# Patient Record
Sex: MALE | Race: WHITE | Employment: OTHER | URBAN - METROPOLITAN AREA
[De-identification: names, ages, dates, MRNs, and addresses within clinical notes are randomized per-mention and may not be internally consistent; named-entity substitution may affect disease eponyms.]

---

## 2017-05-09 ENCOUNTER — HOSPITAL ENCOUNTER (OUTPATIENT)
Dept: CT IMAGING | Age: 72
Discharge: HOME | End: 2017-05-09
Attending: INTERNAL MEDICINE
Payer: MEDICARE

## 2017-05-09 DIAGNOSIS — I71.20 THORACIC AORTIC ANEURYSM WITHOUT RUPTURE: ICD-10-CM

## 2017-05-09 PROCEDURE — 74011636320 HC RX REV CODE- 636/320: Performed by: INTERNAL MEDICINE

## 2017-05-09 PROCEDURE — 74011000258 HC RX REV CODE- 258: Performed by: INTERNAL MEDICINE

## 2017-05-09 PROCEDURE — 71275 CT ANGIOGRAPHY CHEST: CPT

## 2017-05-09 RX ORDER — SODIUM CHLORIDE 0.9 % (FLUSH) 0.9 %
10 SYRINGE (ML) INJECTION
Status: COMPLETED | OUTPATIENT
Start: 2017-05-09 | End: 2017-05-09

## 2017-05-09 RX ADMIN — IOPAMIDOL 100 ML: 755 INJECTION, SOLUTION INTRAVENOUS at 09:16

## 2017-05-09 RX ADMIN — Medication 10 ML: at 09:16

## 2017-05-09 RX ADMIN — SODIUM CHLORIDE 100 ML: 900 INJECTION, SOLUTION INTRAVENOUS at 09:16

## 2017-05-22 ENCOUNTER — HOSPITAL ENCOUNTER (OUTPATIENT)
Dept: GENERAL RADIOLOGY | Age: 72
Discharge: HOME | End: 2017-05-22
Payer: MEDICARE

## 2017-05-22 DIAGNOSIS — R76.11 HISTORY OF POSITIVE PPD, UNTREATED: Chronic | ICD-10-CM

## 2017-05-22 PROCEDURE — 71020 XR CHEST PA LAT: CPT

## 2018-01-17 ENCOUNTER — HOSPITAL ENCOUNTER (OUTPATIENT)
Dept: LAB | Age: 73
Discharge: HOME | End: 2018-01-17

## 2018-01-17 PROCEDURE — 88312 SPECIAL STAINS GROUP 1: CPT | Performed by: INTERNAL MEDICINE

## 2018-01-17 PROCEDURE — 88305 TISSUE EXAM BY PATHOLOGIST: CPT | Performed by: INTERNAL MEDICINE

## 2018-05-03 ENCOUNTER — HOSPITAL ENCOUNTER (OUTPATIENT)
Dept: CT IMAGING | Age: 73
Discharge: HOME | End: 2018-05-03
Attending: THORACIC SURGERY (CARDIOTHORACIC VASCULAR SURGERY)
Payer: MEDICARE

## 2018-05-03 DIAGNOSIS — I71.21 THORACIC ASCENDING AORTIC ANEURYSM: ICD-10-CM

## 2018-05-03 PROCEDURE — 74011636320 HC RX REV CODE- 636/320: Performed by: THORACIC SURGERY (CARDIOTHORACIC VASCULAR SURGERY)

## 2018-05-03 PROCEDURE — 71275 CT ANGIOGRAPHY CHEST: CPT

## 2018-05-03 PROCEDURE — 74011000258 HC RX REV CODE- 258: Performed by: THORACIC SURGERY (CARDIOTHORACIC VASCULAR SURGERY)

## 2018-05-03 RX ORDER — SODIUM CHLORIDE 0.9 % (FLUSH) 0.9 %
10 SYRINGE (ML) INJECTION
Status: COMPLETED | OUTPATIENT
Start: 2018-05-03 | End: 2018-05-03

## 2018-05-03 RX ADMIN — SODIUM CHLORIDE 100 ML: 900 INJECTION, SOLUTION INTRAVENOUS at 11:14

## 2018-05-03 RX ADMIN — IOPAMIDOL 119 ML: 755 INJECTION, SOLUTION INTRAVENOUS at 11:14

## 2018-05-03 RX ADMIN — Medication 10 ML: at 11:14

## 2019-09-11 ENCOUNTER — HOSPITAL ENCOUNTER (OUTPATIENT)
Dept: ULTRASOUND IMAGING | Age: 74
Discharge: HOME | End: 2019-09-11
Attending: FAMILY MEDICINE
Payer: MEDICARE

## 2019-09-11 DIAGNOSIS — I71.20 THORACIC AORTIC ANEURYSM WITHOUT RUPTURE: ICD-10-CM

## 2019-09-11 PROCEDURE — 93978 VASCULAR STUDY: CPT

## 2021-09-16 ENCOUNTER — HOSPITAL ENCOUNTER (OUTPATIENT)
Dept: CT IMAGING | Age: 76
Discharge: HOME | End: 2021-09-16
Attending: INTERNAL MEDICINE
Payer: MEDICARE

## 2021-09-16 DIAGNOSIS — I71.20 THORACIC AORTIC ANEURYSM WITHOUT RUPTURE: Chronic | ICD-10-CM

## 2021-09-16 LAB — CREAT BLD-MCNC: 1.09 MG/DL (ref 0.8–1.5)

## 2021-09-16 PROCEDURE — 82565 ASSAY OF CREATININE: CPT

## 2021-09-16 PROCEDURE — 74011000636 HC RX REV CODE- 636: Performed by: INTERNAL MEDICINE

## 2021-09-16 PROCEDURE — 74011000258 HC RX REV CODE- 258: Performed by: INTERNAL MEDICINE

## 2021-09-16 PROCEDURE — 71275 CT ANGIOGRAPHY CHEST: CPT

## 2021-09-16 RX ORDER — SODIUM CHLORIDE 0.9 % (FLUSH) 0.9 %
10 SYRINGE (ML) INJECTION
Status: COMPLETED | OUTPATIENT
Start: 2021-09-16 | End: 2021-09-16

## 2021-09-16 RX ADMIN — Medication 10 ML: at 10:19

## 2021-09-16 RX ADMIN — IOPAMIDOL 100 ML: 755 INJECTION, SOLUTION INTRAVENOUS at 10:19

## 2021-09-16 RX ADMIN — SODIUM CHLORIDE 100 ML: 900 INJECTION, SOLUTION INTRAVENOUS at 10:19

## 2022-08-02 ENCOUNTER — OFFICE VISIT (OUTPATIENT)
Dept: CARDIOLOGY CLINIC | Age: 77
End: 2022-08-02
Payer: MEDICARE

## 2022-08-02 VITALS
WEIGHT: 208.5 LBS | DIASTOLIC BLOOD PRESSURE: 84 MMHG | HEART RATE: 60 BPM | HEIGHT: 74 IN | BODY MASS INDEX: 26.76 KG/M2 | SYSTOLIC BLOOD PRESSURE: 136 MMHG

## 2022-08-02 DIAGNOSIS — I10 PRIMARY HYPERTENSION: ICD-10-CM

## 2022-08-02 DIAGNOSIS — I35.0 SEVERE AORTIC VALVE STENOSIS: Primary | ICD-10-CM

## 2022-08-02 DIAGNOSIS — Z95.3 HISTORY OF AORTIC VALVE REPLACEMENT WITH BIOPROSTHETIC VALVE: ICD-10-CM

## 2022-08-02 DIAGNOSIS — I71.20 THORACIC AORTIC ANEURYSM WITHOUT RUPTURE: ICD-10-CM

## 2022-08-02 PROCEDURE — G8417 CALC BMI ABV UP PARAM F/U: HCPCS | Performed by: INTERNAL MEDICINE

## 2022-08-02 PROCEDURE — 1123F ACP DISCUSS/DSCN MKR DOCD: CPT | Performed by: INTERNAL MEDICINE

## 2022-08-02 PROCEDURE — 99214 OFFICE O/P EST MOD 30 MIN: CPT | Performed by: INTERNAL MEDICINE

## 2022-08-02 PROCEDURE — G8428 CUR MEDS NOT DOCUMENT: HCPCS | Performed by: INTERNAL MEDICINE

## 2022-08-02 PROCEDURE — 93000 ELECTROCARDIOGRAM COMPLETE: CPT | Performed by: INTERNAL MEDICINE

## 2022-08-02 PROCEDURE — 4004F PT TOBACCO SCREEN RCVD TLK: CPT | Performed by: INTERNAL MEDICINE

## 2022-08-02 PROCEDURE — 3017F COLORECTAL CA SCREEN DOC REV: CPT | Performed by: INTERNAL MEDICINE

## 2022-08-02 ASSESSMENT — ENCOUNTER SYMPTOMS
ABDOMINAL PAIN: 0
SHORTNESS OF BREATH: 0

## 2022-08-02 NOTE — PROGRESS NOTES
[address], [address] [facility], [address] [place] [address]  PHONE: [phone]    Jaems Garcia  1945      SUBJECTIVE:   James Garcia is a 78 y.o. male seen for a follow up visit regarding the following:     Chief Complaint   Patient presents with    Follow-up     S/P AVR       HPI:    81-year-old male comes back for follow-up of a history of a bioprosthetic aortic valve replacement for aortic stenosis history of dilated thoracic aorta which is not been operated on in the past.  Last CT a year ago September was 4.5 cm. He has been doing well with no complaints of shortness of breath or chest pain we had him on ARB in the past discussed in the past going back on that but he does not really want to take the medicines       Past Medical History, Past Surgical History, Family history, Social History, and Medications were all reviewed with the patient today and updated as necessary. Allergies   Allergen Reactions    Pseudoephedrine Hcl Other (See Comments)     Agitated & blisters on hands & feet.     Triprolidine-Pseudoephedrine Other (See Comments)     Make him hyper and the palms of his hands blister and peel     Past Medical History:   Diagnosis Date    Allergic rhinitis 4/23/2014    Aortic stenosis 10/16/2015    Arrhythmia     Arthritis     Atelectasis 7/10/2015    Dysphagia 5/21/2015    Mostly with \"stringy\" foods     Environmental allergies     GERD (gastroesophageal reflux disease)     Hiatal hernia     History of tuberculosis exposure     tested positive 30 years ago     Hypernatremia 7/10/2015    Hypertension     Mononucleosis     Onychomycosis 4/23/2014    S/P AVR (aortic valve replacement) 7/8/2015    Seborrheic keratosis 4/23/2014    Severe aortic valve stenosis 7/9/2015    7/8/15 (Dr Grey Achilles) AORTIC VALVE REPLACEMENT (AVR)with 25 mm Tilley bovine pericardial valve; ascending aortoplasty 4/25/15 ECHO Ejection fraction was estimated in the range of 60 % to 65 %      Thoracic aortic aneurysm ([address] 07/27/2021 12:03 PM     07/27/2021 12:03 PM    CO2 25 07/27/2021 12:03 PM    BUN 16 07/27/2021 12:03 PM    GFRAA >60 09/16/2021 10:16 AM     Lab Results   Component Value Date/Time    CHOL 155 08/30/2019 09:35 AM    HDL 66 08/30/2019 09:35 AM         ASSESSMENT and Madison Anurag was seen today for follow-up. Diagnoses and all orders for this visit:    Severe aortic valve stenosis status post aortic valve replacement doing well  -     EKG 12 Lead    Thoracic aortic aneurysm ([address] we will follow-up with CT scan again to make sure it has not changed. We used to have him on ARB and he does not really want take that medicine right now. We will reassess the CT scan make sure it has not changed any  -     EKG 12 Lead  -     CTA CHEST W CONTRAST; Future    Hypertension currently stable on no current medications. Currently stable  -     EKG 12 Lead    History of aortic valve replacement with bioprosthetic valve the valve is doing well last echo showed a stable function  -     CTA CHEST W CONTRAST; Future      [unfilled]      No follow-up provider specified.     Florence Dao MD  8/2/2022  12:45 PM

## 2022-09-01 ENCOUNTER — OFFICE VISIT (OUTPATIENT)
Dept: FAMILY MEDICINE CLINIC | Facility: CLINIC | Age: 77
End: 2022-09-01
Payer: MEDICARE

## 2022-09-01 VITALS
HEIGHT: 74 IN | DIASTOLIC BLOOD PRESSURE: 70 MMHG | WEIGHT: 206 LBS | OXYGEN SATURATION: 98 % | SYSTOLIC BLOOD PRESSURE: 124 MMHG | BODY MASS INDEX: 26.44 KG/M2 | HEART RATE: 63 BPM

## 2022-09-01 DIAGNOSIS — R35.1 NOCTURIA: ICD-10-CM

## 2022-09-01 DIAGNOSIS — Z00.00 MEDICARE ANNUAL WELLNESS VISIT, SUBSEQUENT: Primary | ICD-10-CM

## 2022-09-01 DIAGNOSIS — K21.9 GASTRO-ESOPHAGEAL REFLUX DISEASE WITHOUT ESOPHAGITIS: ICD-10-CM

## 2022-09-01 DIAGNOSIS — I71.20 THORACIC AORTIC ANEURYSM, WITHOUT RUPTURE: ICD-10-CM

## 2022-09-01 LAB
ALBUMIN SERPL-MCNC: 3.8 G/DL (ref 3.2–4.6)
ALBUMIN/GLOB SERPL: 1.1 {RATIO} (ref 1.2–3.5)
ALP SERPL-CCNC: 50 U/L (ref 50–136)
ALT SERPL-CCNC: 39 U/L (ref 12–65)
ANION GAP SERPL CALC-SCNC: 5 MMOL/L (ref 4–13)
AST SERPL-CCNC: 22 U/L (ref 15–37)
BASOPHILS # BLD: 0 K/UL (ref 0–0.2)
BASOPHILS NFR BLD: 0 % (ref 0–2)
BILIRUB SERPL-MCNC: 0.4 MG/DL (ref 0.2–1.1)
BUN SERPL-MCNC: 18 MG/DL (ref 8–23)
CALCIUM SERPL-MCNC: 9.1 MG/DL (ref 8.3–10.4)
CHLORIDE SERPL-SCNC: 109 MMOL/L (ref 101–110)
CHOLEST SERPL-MCNC: 147 MG/DL
CO2 SERPL-SCNC: 25 MMOL/L (ref 21–32)
CREAT SERPL-MCNC: 1.1 MG/DL (ref 0.8–1.5)
DIFFERENTIAL METHOD BLD: ABNORMAL
EOSINOPHIL # BLD: 0.1 K/UL (ref 0–0.8)
EOSINOPHIL NFR BLD: 1 % (ref 0.5–7.8)
ERYTHROCYTE [DISTWIDTH] IN BLOOD BY AUTOMATED COUNT: 13.5 % (ref 11.9–14.6)
GLOBULIN SER CALC-MCNC: 3.4 G/DL (ref 2.3–3.5)
GLUCOSE SERPL-MCNC: 112 MG/DL (ref 65–100)
HCT VFR BLD AUTO: 42.9 % (ref 41.1–50.3)
HDLC SERPL-MCNC: 62 MG/DL (ref 40–60)
HDLC SERPL: 2.4 {RATIO}
HGB BLD-MCNC: 14.3 G/DL (ref 13.6–17.2)
IMM GRANULOCYTES # BLD AUTO: 0 K/UL (ref 0–0.5)
IMM GRANULOCYTES NFR BLD AUTO: 0 % (ref 0–5)
LDLC SERPL CALC-MCNC: 48.2 MG/DL
LYMPHOCYTES # BLD: 1.7 K/UL (ref 0.5–4.6)
LYMPHOCYTES NFR BLD: 24 % (ref 13–44)
MCH RBC QN AUTO: 31.1 PG (ref 26.1–32.9)
MCHC RBC AUTO-ENTMCNC: 33.3 G/DL (ref 31.4–35)
MCV RBC AUTO: 93.3 FL (ref 79.6–97.8)
MONOCYTES # BLD: 0.6 K/UL (ref 0.1–1.3)
MONOCYTES NFR BLD: 9 % (ref 4–12)
NEUTS SEG # BLD: 4.5 K/UL (ref 1.7–8.2)
NEUTS SEG NFR BLD: 66 % (ref 43–78)
NRBC # BLD: 0 K/UL (ref 0–0.2)
PLATELET # BLD AUTO: 149 K/UL (ref 150–450)
PMV BLD AUTO: 10.5 FL (ref 9.4–12.3)
POTASSIUM SERPL-SCNC: 4 MMOL/L (ref 3.5–5.1)
PROT SERPL-MCNC: 7.2 G/DL (ref 6.3–8.2)
PSA SERPL-MCNC: 0.8 NG/ML
RBC # BLD AUTO: 4.6 M/UL (ref 4.23–5.6)
SODIUM SERPL-SCNC: 139 MMOL/L (ref 138–145)
TRIGL SERPL-MCNC: 184 MG/DL (ref 35–150)
VLDLC SERPL CALC-MCNC: 36.8 MG/DL (ref 6–23)
WBC # BLD AUTO: 6.9 K/UL (ref 4.3–11.1)

## 2022-09-01 PROCEDURE — 3017F COLORECTAL CA SCREEN DOC REV: CPT | Performed by: FAMILY MEDICINE

## 2022-09-01 PROCEDURE — G0439 PPPS, SUBSEQ VISIT: HCPCS | Performed by: FAMILY MEDICINE

## 2022-09-01 PROCEDURE — 1123F ACP DISCUSS/DSCN MKR DOCD: CPT | Performed by: FAMILY MEDICINE

## 2022-09-01 RX ORDER — FEXOFENADINE HCL 180 MG/1
TABLET ORAL
COMMUNITY

## 2022-09-01 RX ORDER — OMEPRAZOLE 40 MG/1
40 CAPSULE, DELAYED RELEASE ORAL DAILY
Qty: 90 CAPSULE | Refills: 3 | Status: SHIPPED | OUTPATIENT
Start: 2022-09-01

## 2022-09-01 ASSESSMENT — PATIENT HEALTH QUESTIONNAIRE - PHQ9
SUM OF ALL RESPONSES TO PHQ9 QUESTIONS 1 & 2: 0
2. FEELING DOWN, DEPRESSED OR HOPELESS: 0
SUM OF ALL RESPONSES TO PHQ QUESTIONS 1-9: 0
SUM OF ALL RESPONSES TO PHQ QUESTIONS 1-9: 0
1. LITTLE INTEREST OR PLEASURE IN DOING THINGS: 0
SUM OF ALL RESPONSES TO PHQ QUESTIONS 1-9: 0
SUM OF ALL RESPONSES TO PHQ QUESTIONS 1-9: 0

## 2022-09-01 ASSESSMENT — LIFESTYLE VARIABLES
HOW MANY STANDARD DRINKS CONTAINING ALCOHOL DO YOU HAVE ON A TYPICAL DAY: 1 OR 2
HOW OFTEN DO YOU HAVE A DRINK CONTAINING ALCOHOL: MONTHLY OR LESS

## 2022-09-01 NOTE — PROGRESS NOTES
Medicare Annual Wellness Visit    Maritza Mckeon is here for Medication Check    Assessment & Plan   Medicare annual wellness visit, subsequent  -Discussed recommended vaccines  -Has received four doses of COVID  -Shingrix vaccine completed  -Cologuard done last year. Gastro-esophageal reflux disease without esophagitis  Symptoms controled with medication. No changes made today. Thoracic aortic aneurysm, without rupture (HCC)  -     CBC with Auto Differential; Future  -     Comprehensive Metabolic Panel; Future  -     Lipid Panel; Future  Nocturia  -     PSA, Diagnostic; Future    Recommendations for Preventive Services Due: see orders and patient instructions/AVS.  Recommended screening schedule for the next 5-10 years is provided to the patient in written form: see Patient Instructions/AVS.     Return for Medicare Annual Wellness Visit in 1 year. Subjective   The following acute and/or chronic problems were also addressed today:  Patient reports acid reflux is well controlled with omeprazole. Continues to follow up with cardiology regarding Thoracic aneurysm and valve repair. Patient's complete Health Risk Assessment and screening values have been reviewed and are found in Flowsheets. The following problems were reviewed today and where indicated follow up appointments were made and/or referrals ordered.     Positive Risk Factor Screenings with Interventions:              Health Habits/Nutrition:  Physical Activity: Sufficiently Active    Days of Exercise per Week: 7 days    Minutes of Exercise per Session: 40 min     Have you lost any weight without trying in the past 3 months?: No  Body mass index: (!) 26.45  Have you seen the dentist within the past year?: (!) No  Health Habits/Nutrition Interventions:  Dental exam overdue:  patient encouraged to make appointment with his/her dentist    Hearing/Vision:  Do you or your family notice any trouble with your hearing that hasn't been managed with hearing aids?: No  Do you have difficulty driving, watching TV, or doing any of your daily activities because of your eyesight?: (!) Yes  Have you had an eye exam within the past year?: Yes  No results found. Hearing/Vision Interventions:  Vision concerns:  patient encouraged to make appointment with his/her eye specialist    Safety:  Do you have working smoke detectors?: (!) No  Do you have any tripping hazards - loose or unsecured carpets or rugs?: No  Do you have any tripping hazards - clutter in doorways, halls, or stairs?: No  Do all of your stairways have a railing or banister?: (!) No  Do you always fasten your seatbelt when you are in a car?: Yes  Safety Interventions:  Home safety tips provided           Objective   Vitals:    09/01/22 1459   BP: 124/70   Pulse: 63   SpO2: 98%   Weight: 206 lb (93.4 kg)   Height: 6' 2\" (1.88 m)      Body mass index is 26.45 kg/m². Physical Exam  Constitutional:       General: He is not in acute distress. Appearance: Normal appearance. He is obese. He is not ill-appearing. Eyes:      General: No scleral icterus. Conjunctiva/sclera: Conjunctivae normal.   Neck:      Vascular: No carotid bruit. Cardiovascular:      Rate and Rhythm: Normal rate and regular rhythm. Heart sounds: Murmur heard. Pulmonary:      Effort: Pulmonary effort is normal. No respiratory distress. Breath sounds: Normal breath sounds. No wheezing. Musculoskeletal:      Cervical back: Neck supple. Right lower leg: No edema. Left lower leg: No edema. Skin:     Findings: No lesion or rash. Neurological:      General: No focal deficit present. Mental Status: He is oriented to person, place, and time. Psychiatric:         Mood and Affect: Mood normal.         Behavior: Behavior normal.          Allergies   Allergen Reactions    Pseudoephedrine Hcl Other (See Comments)     Agitated & blisters on hands & feet.     Triprolidine-Pseudoephedrine Other (See Comments) Make him hyper and the palms of his hands blister and peel     Prior to Visit Medications    Medication Sig Taking?  Authorizing Provider   Multiple Vitamin (MULTI-DAY PO) Take by mouth Yes Historical Provider, MD   ascorbic acid (VITAMIN C) 500 MG tablet Take 1,000 mg by mouth daily Yes Ar Automatic Reconciliation   vitamin D 25 MCG (1000 UT) CAPS Take 2,000 Units by mouth daily Yes Ar Automatic Reconciliation   omeprazole (PRILOSEC) 40 MG delayed release capsule Take 40 mg by mouth daily Yes Ar Automatic Reconciliation   fexofenadine (ALLEGRA) 180 MG tablet 1 tablet  Historical Provider, MD   Multiple Vitamins-Minerals (MULTIVITAMIN ADULT EXTRA C PO)   Historical Provider, MD Monae (Including outside providers/suppliers regularly involved in providing care):   Patient Care Team:  Jorge Nolan MD as PCP - Sandie Zavala MD as PCP - [facility] Empaneled Provider     Reviewed and updated this visit:  Tobacco  Allergies  Meds  Problems  Med Hx  Surg Hx  Soc Hx  Fam Hx

## 2022-09-01 NOTE — PATIENT INSTRUCTIONS
Personalized Preventive Plan for Ivana Villalta - 9/1/2022  Medicare offers a range of preventive health benefits. Some of the tests and screenings are paid in full while other may be subject to a deductible, co-insurance, and/or copay. Some of these benefits include a comprehensive review of your medical history including lifestyle, illnesses that may run in your family, and various assessments and screenings as appropriate. After reviewing your medical record and screening and assessments performed today your provider may have ordered immunizations, labs, imaging, and/or referrals for you. A list of these orders (if applicable) as well as your Preventive Care list are included within your After Visit Summary for your review. Other Preventive Recommendations:    A preventive eye exam performed by an eye specialist is recommended every 1-2 years to screen for glaucoma; cataracts, macular degeneration, and other eye disorders. A preventive dental visit is recommended every 6 months. Try to get at least 150 minutes of exercise per week or 10,000 steps per day on a pedometer . Order or download the FREE \"Exercise & Physical Activity: Your Everyday Guide\" from [employer]. Call 1-228.685.3011 or search [employer] online. You need 4100-3698 mg of calcium and 4817-9620 IU of vitamin D per day. It is possible to meet your calcium requirement with diet alone, but a vitamin D supplement is usually necessary to meet this goal.  When exposed to the sun, use a sunscreen that protects against both UVA and UVB radiation with an SPF of 30 or greater. Reapply every 2 to 3 hours or after sweating, drying off with a towel, or swimming. Always wear a seat belt when traveling in a car. Always wear a helmet when riding a bicycle or motorcycle.

## 2022-12-01 ENCOUNTER — HOSPITAL ENCOUNTER (OUTPATIENT)
Dept: CT IMAGING | Age: 77
Discharge: HOME | End: 2022-12-01
Payer: MEDICARE

## 2022-12-01 ENCOUNTER — TELEPHONE (OUTPATIENT)
Dept: CARDIOLOGY CLINIC | Age: 77
End: 2022-12-01

## 2022-12-01 DIAGNOSIS — I71.20 THORACIC AORTIC ANEURYSM WITHOUT RUPTURE: ICD-10-CM

## 2022-12-01 DIAGNOSIS — Z95.3 HISTORY OF AORTIC VALVE REPLACEMENT WITH BIOPROSTHETIC VALVE: ICD-10-CM

## 2022-12-01 LAB — CREAT BLD-MCNC: 1.05 MG/DL (ref 0.8–1.5)

## 2022-12-01 PROCEDURE — 2580000003 HC RX 258: Performed by: INTERNAL MEDICINE

## 2022-12-01 PROCEDURE — 82565 ASSAY OF CREATININE: CPT

## 2022-12-01 PROCEDURE — 6360000004 HC RX CONTRAST MEDICATION: Performed by: INTERNAL MEDICINE

## 2022-12-01 PROCEDURE — 71275 CT ANGIOGRAPHY CHEST: CPT

## 2022-12-01 RX ORDER — SODIUM CHLORIDE 0.9 % (FLUSH) 0.9 %
10 SYRINGE (ML) INJECTION
Status: COMPLETED | OUTPATIENT
Start: 2022-12-01 | End: 2022-12-01

## 2022-12-01 RX ORDER — 0.9 % SODIUM CHLORIDE 0.9 %
100 INTRAVENOUS SOLUTION INTRAVENOUS
Status: COMPLETED | OUTPATIENT
Start: 2022-12-01 | End: 2022-12-01

## 2022-12-01 RX ADMIN — SODIUM CHLORIDE, PRESERVATIVE FREE 10 ML: 5 INJECTION INTRAVENOUS at 10:01

## 2022-12-01 RX ADMIN — IOPAMIDOL 100 ML: 755 INJECTION, SOLUTION INTRAVENOUS at 10:01

## 2022-12-01 RX ADMIN — SODIUM CHLORIDE 100 ML: 9 INJECTION, SOLUTION INTRAVENOUS at 10:01

## 2022-12-01 NOTE — TELEPHONE ENCOUNTER
----- Message from Ashleigh Rodriguez MD sent at 12/1/2022  1:37 PM EST -----  Call pt the CT shows a stable enlargement of the thoracic aorta.

## 2022-12-05 ENCOUNTER — OFFICE VISIT (OUTPATIENT)
Dept: CARDIOLOGY CLINIC | Age: 77
End: 2022-12-05
Payer: MEDICARE

## 2022-12-05 VITALS
HEART RATE: 52 BPM | WEIGHT: 209 LBS | DIASTOLIC BLOOD PRESSURE: 84 MMHG | SYSTOLIC BLOOD PRESSURE: 128 MMHG | BODY MASS INDEX: 26.82 KG/M2 | HEIGHT: 74 IN

## 2022-12-05 DIAGNOSIS — Z95.2 S/P AVR (AORTIC VALVE REPLACEMENT): ICD-10-CM

## 2022-12-05 DIAGNOSIS — I35.0 SEVERE AORTIC VALVE STENOSIS: ICD-10-CM

## 2022-12-05 DIAGNOSIS — I71.21 ANEURYSM OF ASCENDING AORTA WITHOUT RUPTURE: Primary | ICD-10-CM

## 2022-12-05 DIAGNOSIS — I10 PRIMARY HYPERTENSION: ICD-10-CM

## 2022-12-05 PROCEDURE — 3078F DIAST BP <80 MM HG: CPT | Performed by: INTERNAL MEDICINE

## 2022-12-05 PROCEDURE — 99214 OFFICE O/P EST MOD 30 MIN: CPT | Performed by: INTERNAL MEDICINE

## 2022-12-05 PROCEDURE — 1123F ACP DISCUSS/DSCN MKR DOCD: CPT | Performed by: INTERNAL MEDICINE

## 2022-12-05 PROCEDURE — 3074F SYST BP LT 130 MM HG: CPT | Performed by: INTERNAL MEDICINE

## 2022-12-05 RX ORDER — VALSARTAN 40 MG/1
40 TABLET ORAL DAILY
Qty: 30 TABLET | Refills: 5 | Status: SHIPPED | OUTPATIENT
Start: 2022-12-05

## 2022-12-05 ASSESSMENT — ENCOUNTER SYMPTOMS: SHORTNESS OF BREATH: 0

## 2022-12-05 NOTE — PROGRESS NOTES
[address], [address] [facility], [address] [place] [address]  PHONE: [phone]    Israel Stewart  1945      SUBJECTIVE:   Israel Stewart is a 78 y.o. male seen for a follow up visit regarding the following:     No chief complaint on file. HPI:    62-year-old male comes back for follow-up he has had a bioprosthetic aortic valve replacement aortic stenosis, dilated thoracic aorta which was not operated at the time of surgery. He has been taking no medicines now at all. He did not have any major coronary disease. Overall he is done well and had no shortness of breath or chest pain. We did a recent follow-up CT of the chest showing is smooth dilated thoracic aorta at 4.5 x 4.8 actually not changed significantly. Past Medical History, Past Surgical History, Family history, Social History, and Medications were all reviewed with the patient today and updated as necessary. Allergies   Allergen Reactions    Pseudoephedrine Hcl Other (See Comments)     Agitated & blisters on hands & feet.     Triprolidine-Pseudoephedrine Other (See Comments)     Make him hyper and the palms of his hands blister and peel     Past Medical History:   Diagnosis Date    Allergic rhinitis 4/23/2014    Aortic stenosis 10/16/2015    Arrhythmia     Arthritis     Atelectasis 7/10/2015    Dysphagia 5/21/2015    Mostly with \"stringy\" foods     Environmental allergies     GERD (gastroesophageal reflux disease)     Hiatal hernia     History of tuberculosis exposure     tested positive 30 years ago     Hypernatremia 7/10/2015    Hypertension     Mononucleosis     Onychomycosis 4/23/2014    S/P AVR (aortic valve replacement) 7/8/2015    Seborrheic keratosis 4/23/2014    Severe aortic valve stenosis 7/9/2015    7/8/15 (Dr Matthew Pierre) AORTIC VALVE REPLACEMENT (AVR)with 25 mm Tilley bovine pericardial valve; ascending aortoplasty 4/25/15 ECHO Ejection fraction was estimated in the range of 60 % to 65 %      Thoracic aortic aneurysm ([address] 10/16/2015     Past Surgical History:   Procedure Laterality Date    CARDIAC CATHETERIZATION      CARDIAC VALVE SURGERY  07/2015    AVR bioprothetic with aortaplasty ascending aortaa     COLONOSCOPY      CYST REMOVAL Left     shoulder and back     VASECTOMY       Family History   Problem Relation Age of Onset    Cancer Mother     Hypertension Sister     Diabetes Sister       Social History     Tobacco Use    Smoking status: Never    Smokeless tobacco: Never   Substance Use Topics    Alcohol use: Yes       ROS:    Review of Systems   Constitutional: Negative for decreased appetite. Cardiovascular:  Negative for chest pain, irregular heartbeat and leg swelling. Respiratory:  Negative for shortness of breath. PHYSICAL EXAM:    /84   Pulse 52   Ht 6' 2\" (1.88 m)   Wt 209 lb (94.8 kg)   BMI 26.83 kg/m²        Wt Readings from Last 3 Encounters:   12/05/22 209 lb (94.8 kg)   09/01/22 206 lb (93.4 kg)   08/02/22 208 lb 8 oz (94.6 kg)     BP Readings from Last 3 Encounters:   12/05/22 128/84   09/01/22 124/70   08/02/22 136/84         Physical Exam  Constitutional:       General: He is not in acute distress. Cardiovascular:      Rate and Rhythm: Normal rate and regular rhythm. Musculoskeletal:         General: No swelling. Neurological:      Mental Status: He is alert. Medical problems and test results were reviewed with the patient today. No results found for any visits on 12/05/22.   Lab Results   Component Value Date/Time     09/01/2022 03:36 PM    K 4.0 09/01/2022 03:36 PM     09/01/2022 03:36 PM    CO2 25 09/01/2022 03:36 PM    BUN 18 09/01/2022 03:36 PM    GFRAA >60 09/01/2022 03:36 PM     Lab Results   Component Value Date/Time    CHOL 147 09/01/2022 03:36 PM    HDL 62 09/01/2022 03:36 PM   CT of the chest showed aorta 4.8 x 4.5 but is unchanged      ASSESSMENT and PLAN    Diagnoses and all orders for this visit:    Aneurysm of ascending aorta without rupture this has not changed stable no need for yearly checkups on this. Severe aortic valve stenosis he had this replaced surgically    Primary hypertension pressures fairly stable unguinal put him on an ARB next with his dilated aorta carotid valsartan 40    S/P AVR (aortic valve replacement) the valve is doing very well has a bioprosthetic valve    Other orders  -     valsartan (DIOVAN) 40 MG tablet; Take 1 tablet by mouth daily      [unfilled]      No follow-up provider specified.     Anne-Marie Rosas MD  12/5/2022  2:09 PM

## 2022-12-15 ENCOUNTER — TELEPHONE (OUTPATIENT)
Dept: CARDIOLOGY CLINIC | Age: 77
End: 2022-12-15

## 2022-12-15 NOTE — TELEPHONE ENCOUNTER
Advised patient of Dr. Harman Half response. Advised patient to continue to monitor BP and call with any further questions or concerns. Patient verbalized understanding.

## 2022-12-15 NOTE — TELEPHONE ENCOUNTER
----- Message from Urban Cevallos, [address] [place] sent at 12/15/2022 10:17 AM EST -----  Regarding: FW: Possible allergy to Valsartan    ----- Message -----  From: Keith Underwood  Sent: 12/15/2022   8:14 AM EST  To: [facility]  Subject: Possible allergy to Valsartan                    Good morning. I think I need to stop this new prescription. I'm having an allergic reaction to it  itchy palms, rash, body tingling and itching (same symptoms I have from taking any type of anti-histamine). I thought I should let you know.       Thank you, Sheng Garcia

## 2022-12-15 NOTE — TELEPHONE ENCOUNTER
MD Demi Alaniz RN  Caller: Unspecified (Today,  2:13 PM)  Okay to stop the valsartan just monitor his blood pressure

## 2022-12-28 ENCOUNTER — OFFICE VISIT (OUTPATIENT)
Dept: FAMILY MEDICINE CLINIC | Facility: CLINIC | Age: 77
End: 2022-12-28
Payer: MEDICARE

## 2022-12-28 VITALS
TEMPERATURE: 98.7 F | OXYGEN SATURATION: 96 % | HEIGHT: 74 IN | DIASTOLIC BLOOD PRESSURE: 80 MMHG | HEART RATE: 87 BPM | WEIGHT: 206 LBS | SYSTOLIC BLOOD PRESSURE: 120 MMHG | BODY MASS INDEX: 26.44 KG/M2

## 2022-12-28 DIAGNOSIS — R21 RASH: Primary | ICD-10-CM

## 2022-12-28 PROCEDURE — 99213 OFFICE O/P EST LOW 20 MIN: CPT | Performed by: NURSE PRACTITIONER

## 2022-12-28 PROCEDURE — 3078F DIAST BP <80 MM HG: CPT | Performed by: NURSE PRACTITIONER

## 2022-12-28 PROCEDURE — 3074F SYST BP LT 130 MM HG: CPT | Performed by: NURSE PRACTITIONER

## 2022-12-28 PROCEDURE — 1123F ACP DISCUSS/DSCN MKR DOCD: CPT | Performed by: NURSE PRACTITIONER

## 2022-12-28 ASSESSMENT — PATIENT HEALTH QUESTIONNAIRE - PHQ9
SUM OF ALL RESPONSES TO PHQ QUESTIONS 1-9: 0
SUM OF ALL RESPONSES TO PHQ QUESTIONS 1-9: 0
SUM OF ALL RESPONSES TO PHQ9 QUESTIONS 1 & 2: 0
SUM OF ALL RESPONSES TO PHQ QUESTIONS 1-9: 0
SUM OF ALL RESPONSES TO PHQ QUESTIONS 1-9: 0
1. LITTLE INTEREST OR PLEASURE IN DOING THINGS: 0
2. FEELING DOWN, DEPRESSED OR HOPELESS: 0

## 2022-12-28 ASSESSMENT — ENCOUNTER SYMPTOMS: RESPIRATORY NEGATIVE: 1

## 2022-12-28 NOTE — PROGRESS NOTES
Tin Ayala is a 78 y.o. male who presents today for the following:  Chief Complaint   Patient presents with    Rash       Allergies   Allergen Reactions    Pseudoephedrine Hcl Other (See Comments)     Agitated & blisters on hands & feet. Triprolidine-Pseudoephedrine Other (See Comments)     Make him hyper and the palms of his hands blister and peel    Valsartan Rash       Current Outpatient Medications   Medication Sig Dispense Refill    LUTEIN PO Take by mouth      Multiple Vitamins-Minerals (PRESERVISION AREDS PO) Take by mouth      fexofenadine (ALLEGRA) 180 MG tablet 1 tablet      Multiple Vitamins-Minerals (MULTIVITAMIN ADULT EXTRA C PO)       omeprazole (PRILOSEC) 40 MG delayed release capsule Take 1 capsule by mouth daily 90 capsule 3    Multiple Vitamin (MULTI-DAY PO) Take by mouth      ascorbic acid (VITAMIN C) 500 MG tablet Take 1,000 mg by mouth daily      vitamin D 25 MCG (1000 UT) CAPS Take 2,000 Units by mouth daily       No current facility-administered medications for this visit.        Past Medical History:   Diagnosis Date    Allergic rhinitis 4/23/2014    Aortic stenosis 10/16/2015    Arrhythmia     Arthritis     Atelectasis 7/10/2015    Dysphagia 5/21/2015    Mostly with \"stringy\" foods     Environmental allergies     GERD (gastroesophageal reflux disease)     Hiatal hernia     History of tuberculosis exposure     tested positive 30 years ago     Hypernatremia 7/10/2015    Hypertension     Mononucleosis     Onychomycosis 4/23/2014    S/P AVR (aortic valve replacement) 7/8/2015    Seborrheic keratosis 4/23/2014    Severe aortic valve stenosis 7/9/2015    7/8/15 (Dr Girish Araiza) AORTIC VALVE REPLACEMENT (AVR)with 25 mm Tilley bovine pericardial valve; ascending aortoplasty 4/25/15 ECHO Ejection fraction was estimated in the range of 60 % to 65 %      Thoracic aortic aneurysm 10/16/2015       Past Surgical History:   Procedure Laterality Date    CARDIAC CATHETERIZATION      CARDIAC VALVE SURGERY  07/2015    AVR bioprothetic with aortaplasty ascending aortaa     COLONOSCOPY      CYST REMOVAL Left     shoulder and back     VASECTOMY         Social History     Tobacco Use    Smoking status: Never    Smokeless tobacco: Never   Substance Use Topics    Alcohol use: Yes        Family History   Problem Relation Age of Onset    Cancer Mother     Hypertension Sister     Diabetes Sister        HPI   Pt reports house infested with fleas and dog with secondary infection from flea bites on antibiotics. He is concerned with hx of valve replacement if he should also be on antibiotics with his flea bite rash. He was on valsartan for 5 days and not taking it for 3 weeks. Stopped when the palms of hands started itching which happened with previous drug allergy. Reports multiple flea bites with itching and rash thought to possibly be also d/t valsartan has convalesced and mainly is concerned about secondary infection and possibly needing antibiotics . Has a valve replacement. Anti-itch creams have helped. Itching controlled now. No hx of eczema. Reports rash to upper thighs, lower legs, arms, chest, and back has much improved. Review of Systems   Constitutional: Negative. Respiratory: Negative. Cardiovascular: Negative. Skin:  Positive for rash. Psychiatric/Behavioral: Negative. /80   Pulse 87   Temp 98.7 °F (37.1 °C)   Ht 6' 2\" (1.88 m)   Wt 206 lb (93.4 kg)   SpO2 96%   BMI 26.45 kg/m²     Physical Exam  Constitutional:       General: He is not in acute distress. Appearance: Normal appearance. He is normal weight. He is not ill-appearing. HENT:      Head: Normocephalic and atraumatic. Right Ear: External ear normal.      Left Ear: External ear normal.   Eyes:      Extraocular Movements: Extraocular movements intact. Conjunctiva/sclera: Conjunctivae normal.      Pupils: Pupils are equal, round, and reactive to light.    Cardiovascular:      Rate and Rhythm: Normal rate and regular rhythm. Pulses: Normal pulses. Heart sounds: Normal heart sounds. Pulmonary:      Effort: Pulmonary effort is normal.      Breath sounds: Normal breath sounds. Abdominal:      General: There is no distension. Musculoskeletal:         General: Normal range of motion. Cervical back: Normal range of motion and neck supple. Right lower leg: No edema. Left lower leg: No edema. Skin:     General: Skin is warm and dry. Coloration: Skin is not jaundiced or pale. Findings: Rash present. No bruising or erythema. Comments: Rash of pin point size scabs and flesh color papules scattered to back, chest, and abdomen. Pt has few scattered to legs and right arm and outer axillary region. Scratch marks to right shin. Neurological:      General: No focal deficit present. Mental Status: He is alert and oriented to person, place, and time. Psychiatric:         Mood and Affect: Mood normal.         Behavior: Behavior normal.         Thought Content: Thought content normal.         Judgment: Judgment normal.        1. Rash   Eczema rash without swelling, erythema, or open areas; no signs or symptoms of secondary infection. Declines hydroxyzine and oral steroid not recommended. His itching has virtually resolved and rash is significantly improving. Recommended OTC Cortisone Eczema cream as needed, cool shower/baths, and continue to keep areas clean and dry and avoid scratching. Patient informed, we will call with blood work results within one week. If you have not heard regarding results in over a week, please contact office. You can also review results on [facility]. Follow-up and Dispositions    Return if symptoms worsen or fail to improve.          Elle Borja, APRN - CNP

## 2023-02-13 ENCOUNTER — OFFICE VISIT (OUTPATIENT)
Dept: FAMILY MEDICINE CLINIC | Facility: CLINIC | Age: 78
End: 2023-02-13
Payer: MEDICARE

## 2023-02-13 VITALS
HEIGHT: 74 IN | BODY MASS INDEX: 26.54 KG/M2 | SYSTOLIC BLOOD PRESSURE: 124 MMHG | HEART RATE: 62 BPM | OXYGEN SATURATION: 95 % | DIASTOLIC BLOOD PRESSURE: 78 MMHG | WEIGHT: 206.8 LBS

## 2023-02-13 DIAGNOSIS — B35.9 TINEA: Primary | ICD-10-CM

## 2023-02-13 PROCEDURE — 3074F SYST BP LT 130 MM HG: CPT | Performed by: NURSE PRACTITIONER

## 2023-02-13 PROCEDURE — 99213 OFFICE O/P EST LOW 20 MIN: CPT | Performed by: NURSE PRACTITIONER

## 2023-02-13 PROCEDURE — 3078F DIAST BP <80 MM HG: CPT | Performed by: NURSE PRACTITIONER

## 2023-02-13 PROCEDURE — 1123F ACP DISCUSS/DSCN MKR DOCD: CPT | Performed by: NURSE PRACTITIONER

## 2023-02-13 RX ORDER — CLOTRIMAZOLE AND BETAMETHASONE DIPROPIONATE 10; .64 MG/G; MG/G
CREAM TOPICAL
Qty: 45 G | Refills: 1 | Status: SHIPPED | OUTPATIENT
Start: 2023-02-13

## 2023-02-13 SDOH — ECONOMIC STABILITY: HOUSING INSECURITY
IN THE LAST 12 MONTHS, WAS THERE A TIME WHEN YOU DID NOT HAVE A STEADY PLACE TO SLEEP OR SLEPT IN A SHELTER (INCLUDING NOW)?: NO

## 2023-02-13 SDOH — ECONOMIC STABILITY: INCOME INSECURITY: HOW HARD IS IT FOR YOU TO PAY FOR THE VERY BASICS LIKE FOOD, HOUSING, MEDICAL CARE, AND HEATING?: NOT HARD AT ALL

## 2023-02-13 SDOH — ECONOMIC STABILITY: FOOD INSECURITY: WITHIN THE PAST 12 MONTHS, YOU WORRIED THAT YOUR FOOD WOULD RUN OUT BEFORE YOU GOT MONEY TO BUY MORE.: NEVER TRUE

## 2023-02-13 SDOH — ECONOMIC STABILITY: FOOD INSECURITY: WITHIN THE PAST 12 MONTHS, THE FOOD YOU BOUGHT JUST DIDN'T LAST AND YOU DIDN'T HAVE MONEY TO GET MORE.: NEVER TRUE

## 2023-02-13 ASSESSMENT — PATIENT HEALTH QUESTIONNAIRE - PHQ9
SUM OF ALL RESPONSES TO PHQ QUESTIONS 1-9: 0
2. FEELING DOWN, DEPRESSED OR HOPELESS: 0
SUM OF ALL RESPONSES TO PHQ9 QUESTIONS 1 & 2: 0
1. LITTLE INTEREST OR PLEASURE IN DOING THINGS: 0

## 2023-02-13 ASSESSMENT — ENCOUNTER SYMPTOMS: WHEEZING: 0

## 2023-03-15 ENCOUNTER — OFFICE VISIT (OUTPATIENT)
Dept: FAMILY MEDICINE CLINIC | Facility: CLINIC | Age: 78
End: 2023-03-15

## 2023-03-15 VITALS
SYSTOLIC BLOOD PRESSURE: 124 MMHG | DIASTOLIC BLOOD PRESSURE: 76 MMHG | HEART RATE: 73 BPM | WEIGHT: 208 LBS | HEIGHT: 74 IN | OXYGEN SATURATION: 97 % | BODY MASS INDEX: 26.69 KG/M2

## 2023-03-15 DIAGNOSIS — Z95.3 HISTORY OF AORTIC VALVE REPLACEMENT WITH BIOPROSTHETIC VALVE: Primary | ICD-10-CM

## 2023-03-15 DIAGNOSIS — K21.9 GASTROESOPHAGEAL REFLUX DISEASE, UNSPECIFIED WHETHER ESOPHAGITIS PRESENT: ICD-10-CM

## 2023-03-15 DIAGNOSIS — I10 PRIMARY HYPERTENSION: ICD-10-CM

## 2023-03-15 DIAGNOSIS — I71.21 ANEURYSM OF ASCENDING AORTA WITHOUT RUPTURE: ICD-10-CM

## 2023-03-15 LAB
ALBUMIN SERPL-MCNC: 4 G/DL (ref 3.2–4.6)
ALBUMIN/GLOB SERPL: 1.5 (ref 0.4–1.6)
ALP SERPL-CCNC: 47 U/L (ref 50–136)
ALT SERPL-CCNC: 34 U/L (ref 12–65)
ANION GAP SERPL CALC-SCNC: 2 MMOL/L (ref 2–11)
AST SERPL-CCNC: 27 U/L (ref 15–37)
BASOPHILS # BLD: 0 K/UL (ref 0–0.2)
BASOPHILS NFR BLD: 0 % (ref 0–2)
BILIRUB SERPL-MCNC: 0.5 MG/DL (ref 0.2–1.1)
BUN SERPL-MCNC: 21 MG/DL (ref 8–23)
CALCIUM SERPL-MCNC: 9.1 MG/DL (ref 8.3–10.4)
CHLORIDE SERPL-SCNC: 110 MMOL/L (ref 101–110)
CO2 SERPL-SCNC: 28 MMOL/L (ref 21–32)
CREAT SERPL-MCNC: 1.1 MG/DL (ref 0.8–1.5)
DIFFERENTIAL METHOD BLD: NORMAL
EOSINOPHIL # BLD: 0.1 K/UL (ref 0–0.8)
EOSINOPHIL NFR BLD: 1 % (ref 0.5–7.8)
ERYTHROCYTE [DISTWIDTH] IN BLOOD BY AUTOMATED COUNT: 13 % (ref 11.9–14.6)
GLOBULIN SER CALC-MCNC: 2.7 G/DL (ref 2.8–4.5)
GLUCOSE SERPL-MCNC: 121 MG/DL (ref 65–100)
HCT VFR BLD AUTO: 44.1 % (ref 41.1–50.3)
HGB BLD-MCNC: 14.8 G/DL (ref 13.6–17.2)
IMM GRANULOCYTES # BLD AUTO: 0 K/UL (ref 0–0.5)
IMM GRANULOCYTES NFR BLD AUTO: 0 % (ref 0–5)
LYMPHOCYTES # BLD: 1.4 K/UL (ref 0.5–4.6)
LYMPHOCYTES NFR BLD: 19 % (ref 13–44)
MCH RBC QN AUTO: 30.8 PG (ref 26.1–32.9)
MCHC RBC AUTO-ENTMCNC: 33.6 G/DL (ref 31.4–35)
MCV RBC AUTO: 91.7 FL (ref 82–102)
MONOCYTES # BLD: 0.7 K/UL (ref 0.1–1.3)
MONOCYTES NFR BLD: 9 % (ref 4–12)
NEUTS SEG # BLD: 5.1 K/UL (ref 1.7–8.2)
NEUTS SEG NFR BLD: 71 % (ref 43–78)
NRBC # BLD: 0 K/UL (ref 0–0.2)
PLATELET # BLD AUTO: 157 K/UL (ref 150–450)
PMV BLD AUTO: 10.3 FL (ref 9.4–12.3)
POTASSIUM SERPL-SCNC: 4.4 MMOL/L (ref 3.5–5.1)
PROT SERPL-MCNC: 6.7 G/DL (ref 6.3–8.2)
RBC # BLD AUTO: 4.81 M/UL (ref 4.23–5.6)
SODIUM SERPL-SCNC: 140 MMOL/L (ref 133–143)
WBC # BLD AUTO: 7.4 K/UL (ref 4.3–11.1)

## 2023-03-15 RX ORDER — OMEPRAZOLE 40 MG/1
40 CAPSULE, DELAYED RELEASE ORAL DAILY
Qty: 90 CAPSULE | Refills: 3 | Status: SHIPPED | OUTPATIENT
Start: 2023-03-15

## 2023-03-15 ASSESSMENT — PATIENT HEALTH QUESTIONNAIRE - PHQ9
SUM OF ALL RESPONSES TO PHQ QUESTIONS 1-9: 0
SUM OF ALL RESPONSES TO PHQ QUESTIONS 1-9: 0
2. FEELING DOWN, DEPRESSED OR HOPELESS: 0
1. LITTLE INTEREST OR PLEASURE IN DOING THINGS: 0
SUM OF ALL RESPONSES TO PHQ QUESTIONS 1-9: 0
SUM OF ALL RESPONSES TO PHQ9 QUESTIONS 1 & 2: 0
SUM OF ALL RESPONSES TO PHQ QUESTIONS 1-9: 0

## 2023-03-15 ASSESSMENT — ENCOUNTER SYMPTOMS
CHEST TIGHTNESS: 0
BACK PAIN: 0
SHORTNESS OF BREATH: 0
DIARRHEA: 0
ABDOMINAL PAIN: 0
WHEEZING: 0
CONSTIPATION: 0

## 2023-03-15 NOTE — PROGRESS NOTES
Adrián Lopez is a 78 y.o. male who presents today for the following:  Chief Complaint   Patient presents with    hypertension       Allergies   Allergen Reactions    Pseudoephedrine Hcl Other (See Comments)     Agitated & blisters on hands & feet. Triprolidine-Pseudoephedrine Other (See Comments)     Make him hyper and the palms of his hands blister and peel    Valsartan Rash       Current Outpatient Medications   Medication Sig Dispense Refill    clotrimazole-betamethasone (LOTRISONE) 1-0.05 % cream Apply topically 2 times daily for up to two weeks to rash. 45 g 1    LUTEIN PO Take by mouth      Multiple Vitamins-Minerals (PRESERVISION AREDS PO) Take by mouth      fexofenadine (ALLEGRA) 180 MG tablet 1 tablet      Multiple Vitamins-Minerals (MULTIVITAMIN ADULT EXTRA C PO)       omeprazole (PRILOSEC) 40 MG delayed release capsule Take 1 capsule by mouth daily 90 capsule 3    Multiple Vitamin (MULTI-DAY PO) Take by mouth      ascorbic acid (VITAMIN C) 500 MG tablet Take 1,000 mg by mouth daily      vitamin D 25 MCG (1000 UT) CAPS Take 2,000 Units by mouth daily       No current facility-administered medications for this visit.        Past Medical History:   Diagnosis Date    Allergic rhinitis 4/23/2014    Aortic stenosis 10/16/2015    Arrhythmia     Arthritis     Atelectasis 7/10/2015    Dysphagia 5/21/2015    Mostly with \"stringy\" foods     Environmental allergies     GERD (gastroesophageal reflux disease)     Hiatal hernia     History of tuberculosis exposure     tested positive 30 years ago     Hypernatremia 7/10/2015    Hypertension     Mononucleosis     Onychomycosis 4/23/2014    S/P AVR (aortic valve replacement) 7/8/2015    Seborrheic keratosis 4/23/2014    Severe aortic valve stenosis 7/9/2015    7/8/15 (Dr Simonne Osler) AORTIC VALVE REPLACEMENT (AVR)with 25 mm Tilley bovine pericardial valve; ascending aortoplasty 4/25/15 ECHO Ejection fraction was estimated in the range of 60 % to 65 %      Thoracic aortic aneurysm 10/16/2015       Past Surgical History:   Procedure Laterality Date    CARDIAC CATHETERIZATION      CARDIAC VALVE SURGERY  07/2015    AVR bioprothetic with aortaplasty ascending aortaa     COLONOSCOPY      CYST REMOVAL Left     shoulder and back     VASECTOMY         Social History     Tobacco Use    Smoking status: Never    Smokeless tobacco: Never   Substance Use Topics    Alcohol use: Yes        Family History   Problem Relation Age of Onset    Cancer Mother     Hypertension Sister     Diabetes Sister        Patient is 78year old male here today for routine follow up. He has a history of aortic valve replacement. Also has thoracic aortic aneurysm that is being monitored by cardiology. He has no acute concerns today. Since I last saw patient he was seen by NP and given cream for rash. States rash is resolving. He continues to take omeprazole for reflux. He no longer is taking blood pressure medication. Review of Systems   Constitutional:  Negative for appetite change, fatigue and unexpected weight change. Respiratory:  Negative for chest tightness, shortness of breath and wheezing. Cardiovascular:  Negative for chest pain. Gastrointestinal:  Negative for abdominal pain, constipation and diarrhea. Endocrine: Negative for polydipsia and polyuria. Genitourinary:  Negative for difficulty urinating, dysuria and frequency. Musculoskeletal:  Negative for back pain and myalgias. Skin:  Negative for rash. Neurological:  Negative for dizziness and light-headedness. Psychiatric/Behavioral:  Negative for confusion. The patient is not nervous/anxious. /76   Pulse 73   Ht 6' 2\" (1.88 m)   Wt 208 lb (94.3 kg)   SpO2 97%   BMI 26.71 kg/m²     Physical Exam  Constitutional:       General: He is not in acute distress. Appearance: Normal appearance. Eyes:      General: No scleral icterus.      Conjunctiva/sclera: Conjunctivae normal.   Neck:      Vascular: No carotid bruit. Cardiovascular:      Rate and Rhythm: Normal rate and regular rhythm. Heart sounds: Normal heart sounds. No murmur heard. Pulmonary:      Effort: Pulmonary effort is normal.      Breath sounds: Normal breath sounds. Musculoskeletal:      Cervical back: Neck supple. Right lower leg: No edema. Left lower leg: No edema. Lymphadenopathy:      Cervical: No cervical adenopathy. Skin:     Findings: No rash. Neurological:      General: No focal deficit present. Mental Status: He is alert and oriented to person, place, and time. Psychiatric:         Mood and Affect: Mood normal.         Behavior: Behavior normal.        1. History of aortic valve replacement with bioprosthetic valve  2. Aneurysm of ascending aorta without rupture  Assessment & Plan:   Continues to follow up with cardiology. Last scan showed stable size of 4.5 by 4.8 cm in 12/2022. 3. Primary hypertension  Assessment & Plan:   Well controlled without medication. Orders:  -     CBC with Auto Differential; Future  -     Comprehensive Metabolic Panel; Future  4. Gastroesophageal reflux disease, unspecified whether esophagitis present  Assessment & Plan:   Well-controlled, continue current medications  Orders:  -     omeprazole (PRILOSEC) 40 MG delayed release capsule; Take 1 capsule by mouth daily, Disp-90 capsule, R-3Normal     Patient informed, we will call with blood work results within one week. If you have not heard regarding results in over a week, please contact office. You can also review results on [email or web address].            Gladys Hagen MD

## 2023-06-05 ENCOUNTER — OFFICE VISIT (OUTPATIENT)
Age: 78
End: 2023-06-05
Payer: MEDICARE

## 2023-06-05 VITALS
DIASTOLIC BLOOD PRESSURE: 92 MMHG | BODY MASS INDEX: 26.18 KG/M2 | HEART RATE: 58 BPM | HEIGHT: 74 IN | SYSTOLIC BLOOD PRESSURE: 138 MMHG | WEIGHT: 204 LBS

## 2023-06-05 DIAGNOSIS — I10 HYPERTENSION, ESSENTIAL: ICD-10-CM

## 2023-06-05 DIAGNOSIS — Z95.2 S/P AVR (AORTIC VALVE REPLACEMENT): Primary | ICD-10-CM

## 2023-06-05 DIAGNOSIS — I71.21 ANEURYSM OF ASCENDING AORTA WITHOUT RUPTURE (HCC): ICD-10-CM

## 2023-06-05 PROCEDURE — 3080F DIAST BP >= 90 MM HG: CPT | Performed by: INTERNAL MEDICINE

## 2023-06-05 PROCEDURE — 1123F ACP DISCUSS/DSCN MKR DOCD: CPT | Performed by: INTERNAL MEDICINE

## 2023-06-05 PROCEDURE — 99214 OFFICE O/P EST MOD 30 MIN: CPT | Performed by: INTERNAL MEDICINE

## 2023-06-05 PROCEDURE — 3075F SYST BP GE 130 - 139MM HG: CPT | Performed by: INTERNAL MEDICINE

## 2023-06-05 ASSESSMENT — ENCOUNTER SYMPTOMS
NAIL CHANGES: 0
COUGH: 0
EYE PAIN: 0
STRIDOR: 0
ABDOMINAL PAIN: 0
APHONIA: 0

## 2023-06-05 NOTE — PATIENT INSTRUCTIONS
Here are some recommendations that may be helpful:    DO:  Maintain strict blood pressure control (~130/80). Maintain a healthy weight. Regular mild-moderate physical activity, such as walking o swimming o light jogging biking dancing or stair climbing    DO NOT:  Push, pull, bear down or lift anything heavier than 30 pounds. Get tattoos or piercings. Smoke (avoid second hand smoke as well) or use any tobacco products. Shovel snow, chop wood, dig earth or use a sledgehammer. Lift heavy weights (heavy = 30 pounds or more). Use a shotgun that has a recoil energy more than 45 foot-pounds (do ask aspecialist for advice/products to reduce recoil energy).

## 2023-06-27 ENCOUNTER — OFFICE VISIT (OUTPATIENT)
Dept: FAMILY MEDICINE CLINIC | Facility: CLINIC | Age: 78
End: 2023-06-27
Payer: MEDICARE

## 2023-06-27 VITALS
TEMPERATURE: 98.1 F | WEIGHT: 206 LBS | BODY MASS INDEX: 26.44 KG/M2 | HEIGHT: 74 IN | SYSTOLIC BLOOD PRESSURE: 124 MMHG | DIASTOLIC BLOOD PRESSURE: 82 MMHG | HEART RATE: 68 BPM | OXYGEN SATURATION: 97 %

## 2023-06-27 DIAGNOSIS — L03.011 PARONYCHIA OF RIGHT INDEX FINGER: Primary | ICD-10-CM

## 2023-06-27 PROCEDURE — 1123F ACP DISCUSS/DSCN MKR DOCD: CPT | Performed by: FAMILY MEDICINE

## 2023-06-27 PROCEDURE — 3074F SYST BP LT 130 MM HG: CPT | Performed by: FAMILY MEDICINE

## 2023-06-27 PROCEDURE — 3079F DIAST BP 80-89 MM HG: CPT | Performed by: FAMILY MEDICINE

## 2023-06-27 PROCEDURE — 99213 OFFICE O/P EST LOW 20 MIN: CPT | Performed by: FAMILY MEDICINE

## 2023-06-27 RX ORDER — SULFAMETHOXAZOLE AND TRIMETHOPRIM 800; 160 MG/1; MG/1
1 TABLET ORAL 2 TIMES DAILY
Qty: 14 TABLET | Refills: 0 | Status: SHIPPED | OUTPATIENT
Start: 2023-06-27 | End: 2023-07-04

## 2023-06-27 RX ORDER — AMOXICILLIN 500 MG/1
2000 TABLET, FILM COATED ORAL ONCE
COMMUNITY
Start: 2023-06-02

## 2023-06-27 ASSESSMENT — PATIENT HEALTH QUESTIONNAIRE - PHQ9
SUM OF ALL RESPONSES TO PHQ QUESTIONS 1-9: 0
SUM OF ALL RESPONSES TO PHQ QUESTIONS 1-9: 0
1. LITTLE INTEREST OR PLEASURE IN DOING THINGS: 0
SUM OF ALL RESPONSES TO PHQ9 QUESTIONS 1 & 2: 0
2. FEELING DOWN, DEPRESSED OR HOPELESS: 0
SUM OF ALL RESPONSES TO PHQ QUESTIONS 1-9: 0
SUM OF ALL RESPONSES TO PHQ QUESTIONS 1-9: 0

## 2023-09-12 SDOH — HEALTH STABILITY: PHYSICAL HEALTH: ON AVERAGE, HOW MANY DAYS PER WEEK DO YOU ENGAGE IN MODERATE TO STRENUOUS EXERCISE (LIKE A BRISK WALK)?: 3 DAYS

## 2023-09-12 SDOH — HEALTH STABILITY: PHYSICAL HEALTH: ON AVERAGE, HOW MANY MINUTES DO YOU ENGAGE IN EXERCISE AT THIS LEVEL?: 30 MIN

## 2023-09-12 ASSESSMENT — PATIENT HEALTH QUESTIONNAIRE - PHQ9
SUM OF ALL RESPONSES TO PHQ QUESTIONS 1-9: 0
1. LITTLE INTEREST OR PLEASURE IN DOING THINGS: 0
SUM OF ALL RESPONSES TO PHQ QUESTIONS 1-9: 0
SUM OF ALL RESPONSES TO PHQ QUESTIONS 1-9: 0
SUM OF ALL RESPONSES TO PHQ9 QUESTIONS 1 & 2: 0
2. FEELING DOWN, DEPRESSED OR HOPELESS: 0
SUM OF ALL RESPONSES TO PHQ QUESTIONS 1-9: 0

## 2023-09-12 ASSESSMENT — LIFESTYLE VARIABLES
HOW MANY STANDARD DRINKS CONTAINING ALCOHOL DO YOU HAVE ON A TYPICAL DAY: PATIENT DOES NOT DRINK
HOW OFTEN DO YOU HAVE SIX OR MORE DRINKS ON ONE OCCASION: 1
HOW MANY STANDARD DRINKS CONTAINING ALCOHOL DO YOU HAVE ON A TYPICAL DAY: 0
HOW OFTEN DO YOU HAVE A DRINK CONTAINING ALCOHOL: NEVER
HOW OFTEN DO YOU HAVE A DRINK CONTAINING ALCOHOL: 1

## 2023-09-15 ENCOUNTER — OFFICE VISIT (OUTPATIENT)
Dept: FAMILY MEDICINE CLINIC | Facility: CLINIC | Age: 78
End: 2023-09-15

## 2023-09-15 VITALS
HEIGHT: 74 IN | OXYGEN SATURATION: 98 % | BODY MASS INDEX: 26.82 KG/M2 | HEART RATE: 64 BPM | DIASTOLIC BLOOD PRESSURE: 92 MMHG | SYSTOLIC BLOOD PRESSURE: 140 MMHG | TEMPERATURE: 97.9 F | WEIGHT: 209 LBS

## 2023-09-15 DIAGNOSIS — I10 PRIMARY HYPERTENSION: ICD-10-CM

## 2023-09-15 DIAGNOSIS — Z00.00 MEDICARE ANNUAL WELLNESS VISIT, SUBSEQUENT: Primary | ICD-10-CM

## 2023-09-15 DIAGNOSIS — M67.40 MUCOID CYST OF JOINT: ICD-10-CM

## 2023-09-15 DIAGNOSIS — K21.9 GASTROESOPHAGEAL REFLUX DISEASE, UNSPECIFIED WHETHER ESOPHAGITIS PRESENT: ICD-10-CM

## 2023-09-15 PROBLEM — C44.92 SQUAMOUS CELL CARCINOMA OF SKIN: Status: ACTIVE | Noted: 2023-09-15

## 2023-09-15 LAB
ALBUMIN SERPL-MCNC: 4 G/DL (ref 3.2–4.6)
ALBUMIN/GLOB SERPL: 1.5 (ref 0.4–1.6)
ALP SERPL-CCNC: 48 U/L (ref 50–136)
ALT SERPL-CCNC: 31 U/L (ref 12–65)
ANION GAP SERPL CALC-SCNC: 2 MMOL/L (ref 2–11)
AST SERPL-CCNC: 26 U/L (ref 15–37)
BASOPHILS # BLD: 0 K/UL (ref 0–0.2)
BASOPHILS NFR BLD: 0 % (ref 0–2)
BILIRUB SERPL-MCNC: 0.3 MG/DL (ref 0.2–1.1)
BUN SERPL-MCNC: 22 MG/DL (ref 8–23)
CALCIUM SERPL-MCNC: 9.2 MG/DL (ref 8.3–10.4)
CHLORIDE SERPL-SCNC: 111 MMOL/L (ref 101–110)
CHOLEST SERPL-MCNC: 155 MG/DL
CO2 SERPL-SCNC: 31 MMOL/L (ref 21–32)
CREAT SERPL-MCNC: 1.2 MG/DL (ref 0.8–1.5)
DIFFERENTIAL METHOD BLD: ABNORMAL
EOSINOPHIL # BLD: 0.1 K/UL (ref 0–0.8)
EOSINOPHIL NFR BLD: 1 % (ref 0.5–7.8)
ERYTHROCYTE [DISTWIDTH] IN BLOOD BY AUTOMATED COUNT: 13.1 % (ref 11.9–14.6)
GLOBULIN SER CALC-MCNC: 2.7 G/DL (ref 2.8–4.5)
GLUCOSE SERPL-MCNC: 104 MG/DL (ref 65–100)
HCT VFR BLD AUTO: 43.8 % (ref 41.1–50.3)
HDLC SERPL-MCNC: 64 MG/DL (ref 40–60)
HDLC SERPL: 2.4
HGB BLD-MCNC: 14.7 G/DL (ref 13.6–17.2)
IMM GRANULOCYTES # BLD AUTO: 0 K/UL (ref 0–0.5)
IMM GRANULOCYTES NFR BLD AUTO: 0 % (ref 0–5)
LDLC SERPL CALC-MCNC: 64.4 MG/DL
LYMPHOCYTES # BLD: 1.6 K/UL (ref 0.5–4.6)
LYMPHOCYTES NFR BLD: 19 % (ref 13–44)
MCH RBC QN AUTO: 30.8 PG (ref 26.1–32.9)
MCHC RBC AUTO-ENTMCNC: 33.6 G/DL (ref 31.4–35)
MCV RBC AUTO: 91.6 FL (ref 82–102)
MONOCYTES # BLD: 0.7 K/UL (ref 0.1–1.3)
MONOCYTES NFR BLD: 9 % (ref 4–12)
NEUTS SEG # BLD: 5.9 K/UL (ref 1.7–8.2)
NEUTS SEG NFR BLD: 71 % (ref 43–78)
NRBC # BLD: 0 K/UL (ref 0–0.2)
PLATELET # BLD AUTO: 145 K/UL (ref 150–450)
PMV BLD AUTO: 10.3 FL (ref 9.4–12.3)
POTASSIUM SERPL-SCNC: 4.4 MMOL/L (ref 3.5–5.1)
PROT SERPL-MCNC: 6.7 G/DL (ref 6.3–8.2)
RBC # BLD AUTO: 4.78 M/UL (ref 4.23–5.6)
SODIUM SERPL-SCNC: 144 MMOL/L (ref 133–143)
TRIGL SERPL-MCNC: 133 MG/DL (ref 35–150)
VLDLC SERPL CALC-MCNC: 26.6 MG/DL (ref 6–23)
WBC # BLD AUTO: 8.3 K/UL (ref 4.3–11.1)

## 2023-09-15 RX ORDER — CARVEDILOL 3.12 MG/1
3.12 TABLET ORAL 2 TIMES DAILY
Qty: 60 TABLET | Refills: 1 | Status: SHIPPED | OUTPATIENT
Start: 2023-09-15

## 2023-09-15 RX ORDER — OMEPRAZOLE 40 MG/1
40 CAPSULE, DELAYED RELEASE ORAL DAILY
Qty: 90 CAPSULE | Refills: 3 | Status: SHIPPED | OUTPATIENT
Start: 2023-09-15

## 2023-09-15 ASSESSMENT — ENCOUNTER SYMPTOMS
SHORTNESS OF BREATH: 0
BACK PAIN: 0
DIARRHEA: 0
ABDOMINAL PAIN: 0
CONSTIPATION: 0
CHEST TIGHTNESS: 0
WHEEZING: 0

## 2023-09-15 ASSESSMENT — PATIENT HEALTH QUESTIONNAIRE - PHQ9
SUM OF ALL RESPONSES TO PHQ QUESTIONS 1-9: 0
SUM OF ALL RESPONSES TO PHQ9 QUESTIONS 1 & 2: 0
1. LITTLE INTEREST OR PLEASURE IN DOING THINGS: 0
SUM OF ALL RESPONSES TO PHQ QUESTIONS 1-9: 0
2. FEELING DOWN, DEPRESSED OR HOPELESS: 0
SUM OF ALL RESPONSES TO PHQ QUESTIONS 1-9: 0
SUM OF ALL RESPONSES TO PHQ QUESTIONS 1-9: 0

## 2023-09-15 NOTE — PATIENT INSTRUCTIONS
[place] [employer] online. You need 1057-1512 mg of calcium and 2821-7460 IU of vitamin D per day. It is possible to meet your calcium requirement with diet alone, but a vitamin D supplement is usually necessary to meet this goal.  When exposed to the sun, use a sunscreen that protects against both UVA and UVB radiation with an SPF of 30 or greater. Reapply every 2 to 3 hours or after sweating, drying off with a towel, or swimming. Always wear a seat belt when traveling in a car. Always wear a helmet when riding a bicycle or motorcycle.

## 2023-09-15 NOTE — ASSESSMENT & PLAN NOTE
Elevated at cardiology appointment and today. When I rechecked today BP up to 160/100. Patient agrees to try low dose of carvedilol. Patient to keep BP log and return in 2-4 weeks for nurse BP check.

## 2023-09-15 NOTE — PROGRESS NOTES
mouth 2 times daily, Disp-60 tablet, R-1Normal  3. Mucoid cyst of joint  -     [address] (Hand Surgery)  4. Gastroesophageal reflux disease, unspecified whether esophagitis present  Assessment & Plan:   Well-controlled, continue current medications  Orders:  -     omeprazole (PRILOSEC) 40 MG delayed release capsule;  Take 1 capsule by mouth daily, Disp-90 capsule, R-3Normal           Dulcy Gottron, MD

## 2023-09-16 ENCOUNTER — HOSPITAL ENCOUNTER (EMERGENCY)
Age: 78
Discharge: HOME | End: 2023-09-17
Attending: STUDENT IN AN ORGANIZED HEALTH CARE EDUCATION/TRAINING PROGRAM
Payer: MEDICARE

## 2023-09-16 ENCOUNTER — APPOINTMENT (OUTPATIENT)
Dept: GENERAL RADIOLOGY | Age: 78
End: 2023-09-16
Payer: MEDICARE

## 2023-09-16 DIAGNOSIS — I71.21 ANEURYSM OF ASCENDING AORTA WITHOUT RUPTURE (HCC): ICD-10-CM

## 2023-09-16 DIAGNOSIS — I10 HYPERTENSION, UNSPECIFIED TYPE: ICD-10-CM

## 2023-09-16 DIAGNOSIS — R07.9 CHEST PAIN, UNSPECIFIED TYPE: Primary | ICD-10-CM

## 2023-09-16 LAB
BASOPHILS # BLD: 0.1 K/UL (ref 0–0.2)
BASOPHILS NFR BLD: 1 % (ref 0–2)
DIFFERENTIAL METHOD BLD: NORMAL
EOSINOPHIL # BLD: 0.1 K/UL (ref 0–0.8)
EOSINOPHIL NFR BLD: 2 % (ref 0.5–7.8)
ERYTHROCYTE [DISTWIDTH] IN BLOOD BY AUTOMATED COUNT: 12.9 % (ref 11.9–14.6)
HCT VFR BLD AUTO: 42.7 % (ref 41.1–50.3)
HGB BLD-MCNC: 14.4 G/DL (ref 13.6–17.2)
IMM GRANULOCYTES # BLD AUTO: 0 K/UL (ref 0–0.5)
IMM GRANULOCYTES NFR BLD AUTO: 0 % (ref 0–5)
LYMPHOCYTES # BLD: 1.9 K/UL (ref 0.5–4.6)
LYMPHOCYTES NFR BLD: 27 % (ref 13–44)
MCH RBC QN AUTO: 30.3 PG (ref 26.1–32.9)
MCHC RBC AUTO-ENTMCNC: 33.7 G/DL (ref 31.4–35)
MCV RBC AUTO: 89.7 FL (ref 82–102)
MONOCYTES # BLD: 0.7 K/UL (ref 0.1–1.3)
MONOCYTES NFR BLD: 9 % (ref 4–12)
NEUTS SEG # BLD: 4.3 K/UL (ref 1.7–8.2)
NEUTS SEG NFR BLD: 61 % (ref 43–78)
NRBC # BLD: 0 K/UL (ref 0–0.2)
PLATELET # BLD AUTO: 157 K/UL (ref 150–450)
PMV BLD AUTO: 10.2 FL (ref 9.4–12.3)
RBC # BLD AUTO: 4.76 M/UL (ref 4.23–5.6)
WBC # BLD AUTO: 7.1 K/UL (ref 4.3–11.1)

## 2023-09-16 PROCEDURE — 93005 ELECTROCARDIOGRAM TRACING: CPT | Performed by: STUDENT IN AN ORGANIZED HEALTH CARE EDUCATION/TRAINING PROGRAM

## 2023-09-16 PROCEDURE — 96374 THER/PROPH/DIAG INJ IV PUSH: CPT

## 2023-09-16 PROCEDURE — 80048 BASIC METABOLIC PNL TOTAL CA: CPT

## 2023-09-16 PROCEDURE — 84484 ASSAY OF TROPONIN QUANT: CPT

## 2023-09-16 PROCEDURE — 71046 X-RAY EXAM CHEST 2 VIEWS: CPT

## 2023-09-16 PROCEDURE — 85025 COMPLETE CBC W/AUTO DIFF WBC: CPT

## 2023-09-16 PROCEDURE — 85610 PROTHROMBIN TIME: CPT

## 2023-09-16 PROCEDURE — 6370000000 HC RX 637 (ALT 250 FOR IP): Performed by: STUDENT IN AN ORGANIZED HEALTH CARE EDUCATION/TRAINING PROGRAM

## 2023-09-16 PROCEDURE — 99285 EMERGENCY DEPT VISIT HI MDM: CPT

## 2023-09-16 RX ORDER — MORPHINE SULFATE 4 MG/ML
4 INJECTION, SOLUTION INTRAMUSCULAR; INTRAVENOUS
Status: COMPLETED | OUTPATIENT
Start: 2023-09-17 | End: 2023-09-17

## 2023-09-16 RX ORDER — ASPIRIN 325 MG
325 TABLET ORAL
Status: COMPLETED | OUTPATIENT
Start: 2023-09-16 | End: 2023-09-16

## 2023-09-16 RX ORDER — NITROGLYCERIN 0.4 MG/1
0.4 TABLET SUBLINGUAL
Status: COMPLETED | OUTPATIENT
Start: 2023-09-16 | End: 2023-09-16

## 2023-09-16 RX ADMIN — NITROGLYCERIN 0.4 MG: 0.4 TABLET, ORALLY DISINTEGRATING SUBLINGUAL at 23:44

## 2023-09-16 RX ADMIN — ASPIRIN 325 MG: 325 TABLET, FILM COATED ORAL at 23:44

## 2023-09-16 ASSESSMENT — PAIN DESCRIPTION - DESCRIPTORS: DESCRIPTORS: TIGHTNESS

## 2023-09-16 ASSESSMENT — LIFESTYLE VARIABLES
HOW OFTEN DO YOU HAVE A DRINK CONTAINING ALCOHOL: NEVER
HOW MANY STANDARD DRINKS CONTAINING ALCOHOL DO YOU HAVE ON A TYPICAL DAY: PATIENT DOES NOT DRINK

## 2023-09-16 ASSESSMENT — PAIN DESCRIPTION - LOCATION: LOCATION: CHEST

## 2023-09-16 ASSESSMENT — PAIN DESCRIPTION - ORIENTATION: ORIENTATION: MID

## 2023-09-16 ASSESSMENT — PAIN SCALES - GENERAL: PAINLEVEL_OUTOF10: 5

## 2023-09-16 ASSESSMENT — PAIN - FUNCTIONAL ASSESSMENT: PAIN_FUNCTIONAL_ASSESSMENT: 0-10

## 2023-09-17 ENCOUNTER — PATIENT MESSAGE (OUTPATIENT)
Age: 78
End: 2023-09-17

## 2023-09-17 ENCOUNTER — APPOINTMENT (OUTPATIENT)
Dept: CT IMAGING | Age: 78
End: 2023-09-17
Payer: MEDICARE

## 2023-09-17 VITALS
WEIGHT: 205 LBS | TEMPERATURE: 98.4 F | HEART RATE: 49 BPM | DIASTOLIC BLOOD PRESSURE: 88 MMHG | HEIGHT: 74 IN | SYSTOLIC BLOOD PRESSURE: 146 MMHG | RESPIRATION RATE: 16 BRPM | OXYGEN SATURATION: 96 % | BODY MASS INDEX: 26.31 KG/M2

## 2023-09-17 LAB
ANION GAP SERPL CALC-SCNC: 2 MMOL/L (ref 2–11)
BUN SERPL-MCNC: 23 MG/DL (ref 8–23)
CALCIUM SERPL-MCNC: 8.8 MG/DL (ref 8.3–10.4)
CHLORIDE SERPL-SCNC: 112 MMOL/L (ref 101–110)
CO2 SERPL-SCNC: 30 MMOL/L (ref 21–32)
CREAT SERPL-MCNC: 1.2 MG/DL (ref 0.8–1.5)
EKG ATRIAL RATE: 60 BPM
EKG DIAGNOSIS: NORMAL
EKG P AXIS: 88 DEGREES
EKG P-R INTERVAL: 166 MS
EKG Q-T INTERVAL: 422 MS
EKG QRS DURATION: 92 MS
EKG QTC CALCULATION (BAZETT): 422 MS
EKG R AXIS: 67 DEGREES
EKG T AXIS: 74 DEGREES
EKG VENTRICULAR RATE: 60 BPM
GLUCOSE SERPL-MCNC: 159 MG/DL (ref 65–100)
INR PPP: 0.9
POTASSIUM SERPL-SCNC: 4.3 MMOL/L (ref 3.5–5.1)
PROTHROMBIN TIME: 12.5 SEC (ref 12.6–14.3)
SODIUM SERPL-SCNC: 144 MMOL/L (ref 133–143)
TROPONIN I SERPL HS-MCNC: 12.2 PG/ML (ref 0–14)
TROPONIN I SERPL HS-MCNC: 14.1 PG/ML (ref 0–14)

## 2023-09-17 PROCEDURE — 96374 THER/PROPH/DIAG INJ IV PUSH: CPT

## 2023-09-17 PROCEDURE — 71275 CT ANGIOGRAPHY CHEST: CPT

## 2023-09-17 PROCEDURE — 6360000004 HC RX CONTRAST MEDICATION: Performed by: STUDENT IN AN ORGANIZED HEALTH CARE EDUCATION/TRAINING PROGRAM

## 2023-09-17 PROCEDURE — 93010 ELECTROCARDIOGRAM REPORT: CPT | Performed by: INTERNAL MEDICINE

## 2023-09-17 PROCEDURE — 84484 ASSAY OF TROPONIN QUANT: CPT

## 2023-09-17 PROCEDURE — 6360000002 HC RX W HCPCS: Performed by: STUDENT IN AN ORGANIZED HEALTH CARE EDUCATION/TRAINING PROGRAM

## 2023-09-17 RX ORDER — SODIUM CHLORIDE 0.9 % (FLUSH) 0.9 %
10 SYRINGE (ML) INJECTION
Status: DISCONTINUED | OUTPATIENT
Start: 2023-09-17 | End: 2023-09-17 | Stop reason: HOSPADM

## 2023-09-17 RX ORDER — 0.9 % SODIUM CHLORIDE 0.9 %
100 INTRAVENOUS SOLUTION INTRAVENOUS
Status: DISCONTINUED | OUTPATIENT
Start: 2023-09-17 | End: 2023-09-17 | Stop reason: HOSPADM

## 2023-09-17 RX ORDER — AMLODIPINE BESYLATE 5 MG/1
5 TABLET ORAL DAILY
Qty: 30 TABLET | Refills: 2 | Status: SHIPPED | OUTPATIENT
Start: 2023-09-17 | End: 2023-09-22 | Stop reason: SDUPTHER

## 2023-09-17 RX ADMIN — IOPAMIDOL 100 ML: 755 INJECTION, SOLUTION INTRAVENOUS at 00:10

## 2023-09-17 RX ADMIN — MORPHINE SULFATE 4 MG: 4 INJECTION INTRAVENOUS at 00:00

## 2023-09-17 ASSESSMENT — PAIN DESCRIPTION - LOCATION
LOCATION: CHEST
LOCATION: CHEST

## 2023-09-17 ASSESSMENT — PAIN DESCRIPTION - DESCRIPTORS
DESCRIPTORS: TIGHTNESS
DESCRIPTORS: TIGHTNESS

## 2023-09-17 ASSESSMENT — PAIN SCALES - GENERAL
PAINLEVEL_OUTOF10: 2
PAINLEVEL_OUTOF10: 2

## 2023-09-17 ASSESSMENT — PAIN DESCRIPTION - ONSET: ONSET: PROGRESSIVE

## 2023-09-17 ASSESSMENT — PAIN DESCRIPTION - FREQUENCY: FREQUENCY: INTERMITTENT

## 2023-09-17 NOTE — ED TRIAGE NOTES
Pt reports HTN and chest tightness that started today. Reports hx of Aortic dissection repair in 2015. Pt reports HTN at home and starting new BP meds today.

## 2023-09-17 NOTE — DISCHARGE INSTRUCTIONS
You may continue taking the carvedilol as prescribed. You may start the new medication (Norvasc) as directed. Please call Dr. Jose Macias clinic on Monday to see about setting up a follow-up appointment. Return to the ER for any new or worsening symptoms.

## 2023-09-17 NOTE — ED PROVIDER NOTES
in the range of 60 % to 65 %      Thoracic aortic aneurysm ([address]) 10/16/2015     Past Surgical History:   Procedure Laterality Date    CARDIAC CATHETERIZATION      CARDIAC VALVE SURGERY  07/2015    AVR bioprothetic with aortaplasty ascending aortaa     COLONOSCOPY      CYST REMOVAL Left     shoulder and back     VASECTOMY       Family History   Problem Relation Age of Onset    Cancer Mother     Hypertension Sister     Diabetes Sister      Allergies   Allergen Reactions    Pseudoephedrine Hcl Other (See Comments)     Agitated & blisters on hands & feet. Triprolidine-Pseudoephedrine Other (See Comments)     Make him hyper and the palms of his hands blister and peel    Valsartan Rash     Physical Exam     Vitals:    09/17/23 0143 09/17/23 0158 09/17/23 0213 09/17/23 0228   BP: 133/84 (!) 140/79 (!) 150/89 (!) 146/88   Pulse: 50 (!) 46 51 (!) 49   Resp: 19 15 16 16   Temp:       TempSrc:       SpO2: 95% 93% 97% 96%   Weight:       Height:         Nursing note and vitals reviewed. Constitutional: Well developed, NAD  HEENT: Atraumatic, conjugate gaze, EOM intact  Neck: Supple  Cardiovascular: No cyanosis, diaphoresis, or JVD appreciated. Normal S1/S2 with no murmurs noted. Respiratory: Effort normal. No respiratory distress. Lungs CTAB. Gastrointestinal: Non-distended. No guarding or rebound. Non-tender. MSK: No deformities appreciated. No peripheral edema. Skin: Skin is warm and dry. No rash appreciated. Neuro: Alert and oriented, moves all four extremities. Psych: Pleasant and cooperative.     Procedures   Procedures    MDM     Labs Reviewed   BASIC METABOLIC PANEL - Abnormal; Notable for the following components:       Result Value    Sodium 144 (*)     Chloride 112 (*)     Glucose 159 (*)     All other components within normal limits   PROTIME-INR - Abnormal; Notable for the following components:    Protime 12.5 (*)     All other components within normal limits   TROPONIN - Abnormal; Notable for the

## 2023-09-21 ENCOUNTER — TELEPHONE (OUTPATIENT)
Age: 78
End: 2023-09-21

## 2023-09-22 ENCOUNTER — OFFICE VISIT (OUTPATIENT)
Age: 78
End: 2023-09-22

## 2023-09-22 VITALS
DIASTOLIC BLOOD PRESSURE: 80 MMHG | HEART RATE: 60 BPM | OXYGEN SATURATION: 94 % | HEIGHT: 74 IN | BODY MASS INDEX: 26.56 KG/M2 | SYSTOLIC BLOOD PRESSURE: 130 MMHG | WEIGHT: 207 LBS

## 2023-09-22 DIAGNOSIS — I10 HYPERTENSION, ESSENTIAL: ICD-10-CM

## 2023-09-22 DIAGNOSIS — I10 PRIMARY HYPERTENSION: ICD-10-CM

## 2023-09-22 DIAGNOSIS — Z95.2 S/P AVR (AORTIC VALVE REPLACEMENT): Primary | ICD-10-CM

## 2023-09-22 DIAGNOSIS — Q25.40 ABNORMALITY OF THORACIC AORTA: ICD-10-CM

## 2023-09-22 RX ORDER — AMLODIPINE BESYLATE 5 MG/1
5 TABLET ORAL DAILY
Qty: 90 TABLET | Refills: 3 | Status: SHIPPED | OUTPATIENT
Start: 2023-09-22

## 2023-09-22 RX ORDER — HYDRALAZINE HYDROCHLORIDE 25 MG/1
25 TABLET, FILM COATED ORAL 3 TIMES DAILY PRN
Qty: 90 TABLET | Refills: 3 | Status: SHIPPED | OUTPATIENT
Start: 2023-09-22

## 2023-09-22 RX ORDER — CARVEDILOL 3.12 MG/1
3.12 TABLET ORAL 2 TIMES DAILY
Qty: 180 TABLET | Refills: 3 | Status: SHIPPED | OUTPATIENT
Start: 2023-09-22 | End: 2023-12-21

## 2023-09-22 ASSESSMENT — ENCOUNTER SYMPTOMS
EYE PAIN: 0
NAIL CHANGES: 0
STRIDOR: 0
ABDOMINAL PAIN: 0
COUGH: 0
APHONIA: 0

## 2023-09-22 NOTE — PROGRESS NOTES
valve replacement)  -     CT CHEST W CONTRAST; Future    Hypertension, essential    Abnormality of thoracic aorta  -     CT CHEST W CONTRAST; Future    Primary hypertension  -     carvedilol (COREG) 3.125 MG tablet; Take 1 tablet by mouth 2 times daily    Other orders  -     hydrALAZINE (APRESOLINE) 25 MG tablet; Take 1 tablet by mouth 3 times daily as needed (Systolic pressure greater than 160 mmHg)  -     amLODIPine (NORVASC) 5 MG tablet; Take 1 tablet by mouth daily      No follow-ups on file.        Nereida Justice MD  9/22/2023  2:47 PM

## 2023-09-25 ENCOUNTER — OFFICE VISIT (OUTPATIENT)
Dept: ORTHOPEDIC SURGERY | Age: 78
End: 2023-09-25
Payer: MEDICARE

## 2023-09-25 DIAGNOSIS — M67.441 DIGITAL MUCOUS CYST OF FINGER OF RIGHT HAND: Primary | ICD-10-CM

## 2023-09-25 PROCEDURE — 1123F ACP DISCUSS/DSCN MKR DOCD: CPT | Performed by: NURSE PRACTITIONER

## 2023-09-25 PROCEDURE — 99204 OFFICE O/P NEW MOD 45 MIN: CPT | Performed by: NURSE PRACTITIONER

## 2023-09-25 NOTE — PROGRESS NOTES
[facility] Note    Name: Howard Ruiz  YOB: 1945  Gender: male  MRN: [id number]      CC: Patient referred for evaluation of cyst on the right index finger    HPI: Howard Ruiz is a 70 y.o. male right hand dominant with a chief complaint of cyst on the right index finger. He reports this started approximately 7 months ago. He said he does fluctuate in size. He said it is very tender when it swollen. He denies any pain in the right index finger. He saw his PCP who referred him to orthopedics for treatment. ROS/Meds/PSH/PMH/FH/SH: I personally reviewed the patients standard intake form. Pertinents are discussed in the HPI    Physical Examination:  General: Awake and alert. HEENT: Normocephalic, atraumatic  CV/Pulm: Breathing even and unlabored  Skin: No obvious rashes noted. Lymphatic: No obvious evidence of lymphedema or lymphadenopathy    Musculoskeletal Exam:  Examination on the right upper extremity demonstrates cap refill < 5 seconds in all fingers  Patient has no swelling to the right index finger. There is a mucous cyst on the dorsal aspect at the base of the nail. There is no erythema or drainage. There is no open areas. He has full range of motion. He is nontender of the DIP joint. He denies paresthesia. He has good capillary refill. Imaging / Electrodiagnostic Tests:     X-rays include a three-view right index finger are reviewed. Patient has mild arthritis at the DIP joint. There is a small bone spur present. Assessment:   1. Digital mucous cyst of finger of right hand        Plan:   We discussed the diagnosis and different treatment options. We discussed observation, therapy, antiinflammatory medications and other pertinent treatment modalities. After discussing in detail the patient elects to proceed with observation and Coban wrapping. We discussed surgical intervention. We also discussed postoperative recovery including restrictions.

## 2023-11-13 ENCOUNTER — PATIENT MESSAGE (OUTPATIENT)
Age: 78
End: 2023-11-13

## 2023-11-14 ENCOUNTER — TELEPHONE (OUTPATIENT)
Age: 78
End: 2023-11-14

## 2023-11-14 NOTE — TELEPHONE ENCOUNTER
----- Message from Javier Lane MA sent at 11/14/2023  9:29 AM EST -----  Regarding: FW: BP and Heart Rate  Contact: [phone]    ----- Message -----  From: Juan Park MA  Sent: 11/13/2023   1:26 PM EST  To: Aris Cruz MA  Subject: FW: BP and Heart Rate                              ----- Message -----  From: Ari Pineda  Sent: 11/13/2023  12:54 PM EST  To: Antonio Serena [facility] Cardiology Clinical Staff  Subject: BP and Heart Rate                                Pipo been taking the 2 blood pressure medications that were prescribed. My blood pressure for the last 2 weeks or so has ranged from 137/78 to 97/65. My pulse rate has ranged from highest of 69 to lowest of 32. My question is whether that is what you wanted? If I get up from sitting too quickly, I get dizzy. Do I need to cut one of the pills back some?     Thank you,  Humaira Guadarrama

## 2023-11-14 NOTE — TELEPHONE ENCOUNTER
Per Dr. Jorden Jones: ok to bring pt in for nurse visit for EKG and VS.    Triage informed pt of Dr. George Chamorro response and scheduled appt for nurse visit 11/15/23 at 10:00 at [employer] office. He confirms appt date, time, and location.

## 2023-11-14 NOTE — TELEPHONE ENCOUNTER
On 9/22/23 these medications were added. hydrALAZINE (APRESOLINE) 25 MG tablet; Take 1 tablet by mouth 3 times daily as needed (Systolic pressure greater than 160 mmHg)  -       amLODIPine (NORVASC) 5 MG tablet; Take 1 tablet by mouth daily  Coreg 3.125mg bid    Pt has only been taking coreg 3.125mg bid and amlodipine 5mg daily. He has not needed hydralazine.

## 2023-11-15 ENCOUNTER — TELEPHONE (OUTPATIENT)
Age: 78
End: 2023-11-15

## 2023-11-15 ENCOUNTER — NURSE ONLY (OUTPATIENT)
Age: 78
End: 2023-11-15

## 2023-11-15 VITALS — HEART RATE: 47 BPM | DIASTOLIC BLOOD PRESSURE: 84 MMHG | SYSTOLIC BLOOD PRESSURE: 136 MMHG

## 2023-11-15 DIAGNOSIS — I49.9 ARRHYTHMIA: Primary | ICD-10-CM

## 2023-11-15 DIAGNOSIS — I10 HYPERTENSION: ICD-10-CM

## 2023-11-15 NOTE — PROGRESS NOTES
Pt came in with concerns of low heart rate. BP in office 132/76 HR 50 manual    Pt cuff 136/84 HR 47    EKG performed     Dr Stephen Fuel reviewed stated pt is having PVCs and he feels low heart rate readings are inaccurate. Pt to wear 7 day tele and scheduled in 2 weeks for a follow up. Pt verbalized understanding.

## 2023-11-15 NOTE — TELEPHONE ENCOUNTER
Per Dr Stephen Fuel pt will need two week follow up s/p 7 day tele for PVCs. Pt to be scheduled on 11/29 in PM if schedule available otherwise be scheduled accordingly.

## 2023-11-17 NOTE — TELEPHONE ENCOUNTER
Called pt and scheduled him for monitor follow up 11/28/23 at 9:45 AM. Confirmed location and time with pt. Pt voiced understanding.

## 2023-11-21 ENCOUNTER — OFFICE VISIT (OUTPATIENT)
Dept: FAMILY MEDICINE CLINIC | Facility: CLINIC | Age: 78
End: 2023-11-21

## 2023-11-21 VITALS
HEIGHT: 74 IN | OXYGEN SATURATION: 100 % | TEMPERATURE: 98.6 F | SYSTOLIC BLOOD PRESSURE: 130 MMHG | WEIGHT: 209.6 LBS | HEART RATE: 51 BPM | DIASTOLIC BLOOD PRESSURE: 70 MMHG | BODY MASS INDEX: 26.9 KG/M2

## 2023-11-21 DIAGNOSIS — L02.91 ABSCESS: ICD-10-CM

## 2023-11-21 DIAGNOSIS — L02.91 ABSCESS: Primary | ICD-10-CM

## 2023-11-21 RX ORDER — CEPHALEXIN 500 MG/1
500 CAPSULE ORAL 3 TIMES DAILY
Qty: 21 CAPSULE | Refills: 0 | Status: SHIPPED | OUTPATIENT
Start: 2023-11-21 | End: 2023-11-28

## 2023-11-21 ASSESSMENT — ENCOUNTER SYMPTOMS
GASTROINTESTINAL NEGATIVE: 1
ALLERGIC/IMMUNOLOGIC NEGATIVE: 1
EYES NEGATIVE: 1
RESPIRATORY NEGATIVE: 1

## 2023-11-21 ASSESSMENT — PATIENT HEALTH QUESTIONNAIRE - PHQ9
SUM OF ALL RESPONSES TO PHQ QUESTIONS 1-9: 0
SUM OF ALL RESPONSES TO PHQ QUESTIONS 1-9: 0
SUM OF ALL RESPONSES TO PHQ9 QUESTIONS 1 & 2: 0
SUM OF ALL RESPONSES TO PHQ QUESTIONS 1-9: 0
1. LITTLE INTEREST OR PLEASURE IN DOING THINGS: 0
2. FEELING DOWN, DEPRESSED OR HOPELESS: 0
SUM OF ALL RESPONSES TO PHQ QUESTIONS 1-9: 0

## 2023-11-23 LAB
BACTERIA SPEC CULT: NORMAL
GRAM STN SPEC: NORMAL
GRAM STN SPEC: NORMAL
SERVICE CMNT-IMP: NORMAL

## 2023-11-28 ENCOUNTER — OFFICE VISIT (OUTPATIENT)
Age: 78
End: 2023-11-28
Payer: MEDICARE

## 2023-11-28 VITALS
SYSTOLIC BLOOD PRESSURE: 134 MMHG | WEIGHT: 209 LBS | BODY MASS INDEX: 26.82 KG/M2 | DIASTOLIC BLOOD PRESSURE: 88 MMHG | HEART RATE: 62 BPM | HEIGHT: 74 IN

## 2023-11-28 DIAGNOSIS — R21 RASH: ICD-10-CM

## 2023-11-28 DIAGNOSIS — Z95.2 S/P AVR (AORTIC VALVE REPLACEMENT): ICD-10-CM

## 2023-11-28 DIAGNOSIS — I10 HYPERTENSION, UNSPECIFIED TYPE: Primary | ICD-10-CM

## 2023-11-28 PROCEDURE — 3075F SYST BP GE 130 - 139MM HG: CPT | Performed by: INTERNAL MEDICINE

## 2023-11-28 PROCEDURE — 3079F DIAST BP 80-89 MM HG: CPT | Performed by: INTERNAL MEDICINE

## 2023-11-28 PROCEDURE — 99214 OFFICE O/P EST MOD 30 MIN: CPT | Performed by: INTERNAL MEDICINE

## 2023-11-28 PROCEDURE — 1123F ACP DISCUSS/DSCN MKR DOCD: CPT | Performed by: INTERNAL MEDICINE

## 2023-11-28 ASSESSMENT — ENCOUNTER SYMPTOMS
STRIDOR: 0
COUGH: 0
EYE PAIN: 0
NAIL CHANGES: 0
ABDOMINAL PAIN: 0
APHONIA: 0

## 2023-12-05 ENCOUNTER — OFFICE VISIT (OUTPATIENT)
Dept: FAMILY MEDICINE CLINIC | Facility: CLINIC | Age: 78
End: 2023-12-05
Payer: MEDICARE

## 2023-12-05 VITALS
TEMPERATURE: 97.6 F | WEIGHT: 209 LBS | HEIGHT: 74 IN | HEART RATE: 64 BPM | DIASTOLIC BLOOD PRESSURE: 80 MMHG | SYSTOLIC BLOOD PRESSURE: 128 MMHG | BODY MASS INDEX: 26.82 KG/M2

## 2023-12-05 DIAGNOSIS — L02.91 ABSCESS: Primary | ICD-10-CM

## 2023-12-05 PROCEDURE — 3079F DIAST BP 80-89 MM HG: CPT | Performed by: FAMILY MEDICINE

## 2023-12-05 PROCEDURE — 1123F ACP DISCUSS/DSCN MKR DOCD: CPT | Performed by: FAMILY MEDICINE

## 2023-12-05 PROCEDURE — 3074F SYST BP LT 130 MM HG: CPT | Performed by: FAMILY MEDICINE

## 2023-12-05 PROCEDURE — 99214 OFFICE O/P EST MOD 30 MIN: CPT | Performed by: FAMILY MEDICINE

## 2023-12-05 RX ORDER — SULFAMETHOXAZOLE AND TRIMETHOPRIM 800; 160 MG/1; MG/1
1 TABLET ORAL 2 TIMES DAILY
Qty: 20 TABLET | Refills: 0 | Status: SHIPPED | OUTPATIENT
Start: 2023-12-05 | End: 2023-12-15

## 2023-12-05 ASSESSMENT — PATIENT HEALTH QUESTIONNAIRE - PHQ9
SUM OF ALL RESPONSES TO PHQ QUESTIONS 1-9: 0
SUM OF ALL RESPONSES TO PHQ QUESTIONS 1-9: 0
SUM OF ALL RESPONSES TO PHQ9 QUESTIONS 1 & 2: 0
SUM OF ALL RESPONSES TO PHQ QUESTIONS 1-9: 0
2. FEELING DOWN, DEPRESSED OR HOPELESS: 0
1. LITTLE INTEREST OR PLEASURE IN DOING THINGS: 0
SUM OF ALL RESPONSES TO PHQ QUESTIONS 1-9: 0

## 2023-12-05 ASSESSMENT — ENCOUNTER SYMPTOMS: SHORTNESS OF BREATH: 0

## 2023-12-06 LAB
BACTERIA SPEC CULT: ABNORMAL
BACTERIA SPEC CULT: ABNORMAL
ORGANISM ID: NORMAL
SERVICE CMNT-IMP: ABNORMAL

## 2023-12-12 ENCOUNTER — APPOINTMENT (OUTPATIENT)
Dept: GENERAL RADIOLOGY | Age: 78
End: 2023-12-12
Payer: MEDICARE

## 2023-12-12 ENCOUNTER — HOSPITAL ENCOUNTER (EMERGENCY)
Age: 78
Discharge: HOME | End: 2023-12-12
Payer: MEDICARE

## 2023-12-12 VITALS
HEART RATE: 61 BPM | RESPIRATION RATE: 19 BRPM | HEIGHT: 74 IN | TEMPERATURE: 98.2 F | WEIGHT: 210 LBS | SYSTOLIC BLOOD PRESSURE: 138 MMHG | DIASTOLIC BLOOD PRESSURE: 89 MMHG | OXYGEN SATURATION: 98 % | BODY MASS INDEX: 26.95 KG/M2

## 2023-12-12 DIAGNOSIS — R07.89 ATYPICAL CHEST PAIN: Primary | ICD-10-CM

## 2023-12-12 LAB
ANION GAP SERPL CALC-SCNC: 4 MMOL/L (ref 2–11)
BASOPHILS # BLD: 0 K/UL (ref 0–0.2)
BASOPHILS NFR BLD: 1 % (ref 0–2)
BUN SERPL-MCNC: 19 MG/DL (ref 8–23)
CALCIUM SERPL-MCNC: 9.6 MG/DL (ref 8.3–10.4)
CHLORIDE SERPL-SCNC: 106 MMOL/L (ref 103–113)
CO2 SERPL-SCNC: 28 MMOL/L (ref 21–32)
CREAT SERPL-MCNC: 1.2 MG/DL (ref 0.8–1.5)
DIFFERENTIAL METHOD BLD: NORMAL
EKG ATRIAL RATE: 75 BPM
EKG DIAGNOSIS: NORMAL
EKG P AXIS: 72 DEGREES
EKG P-R INTERVAL: 168 MS
EKG Q-T INTERVAL: 368 MS
EKG QRS DURATION: 90 MS
EKG QTC CALCULATION (BAZETT): 410 MS
EKG R AXIS: 52 DEGREES
EKG T AXIS: 57 DEGREES
EKG VENTRICULAR RATE: 75 BPM
EOSINOPHIL # BLD: 0.1 K/UL (ref 0–0.8)
EOSINOPHIL NFR BLD: 1 % (ref 0.5–7.8)
ERYTHROCYTE [DISTWIDTH] IN BLOOD BY AUTOMATED COUNT: 13 % (ref 11.9–14.6)
GLUCOSE SERPL-MCNC: 105 MG/DL (ref 65–100)
HCT VFR BLD AUTO: 42.1 % (ref 41.1–50.3)
HGB BLD-MCNC: 14.4 G/DL (ref 13.6–17.2)
IMM GRANULOCYTES # BLD AUTO: 0 K/UL (ref 0–0.5)
IMM GRANULOCYTES NFR BLD AUTO: 0 % (ref 0–5)
LYMPHOCYTES # BLD: 1.2 K/UL (ref 0.5–4.6)
LYMPHOCYTES NFR BLD: 16 % (ref 13–44)
MCH RBC QN AUTO: 30.9 PG (ref 26.1–32.9)
MCHC RBC AUTO-ENTMCNC: 34.2 G/DL (ref 31.4–35)
MCV RBC AUTO: 90.3 FL (ref 82–102)
MONOCYTES # BLD: 0.6 K/UL (ref 0.1–1.3)
MONOCYTES NFR BLD: 9 % (ref 4–12)
NEUTS SEG # BLD: 5.6 K/UL (ref 1.7–8.2)
NEUTS SEG NFR BLD: 73 % (ref 43–78)
NRBC # BLD: 0 K/UL (ref 0–0.2)
PLATELET # BLD AUTO: 170 K/UL (ref 150–450)
PMV BLD AUTO: 10.3 FL (ref 9.4–12.3)
POTASSIUM SERPL-SCNC: 4.4 MMOL/L (ref 3.5–5.1)
RBC # BLD AUTO: 4.66 M/UL (ref 4.23–5.6)
SODIUM SERPL-SCNC: 138 MMOL/L (ref 136–146)
TROPONIN I SERPL HS-MCNC: 8.2 PG/ML (ref 0–14)
TROPONIN I SERPL HS-MCNC: 8.4 PG/ML (ref 0–14)
WBC # BLD AUTO: 7.5 K/UL (ref 4.3–11.1)

## 2023-12-12 PROCEDURE — 85025 COMPLETE CBC W/AUTO DIFF WBC: CPT

## 2023-12-12 PROCEDURE — 84484 ASSAY OF TROPONIN QUANT: CPT

## 2023-12-12 PROCEDURE — 71045 X-RAY EXAM CHEST 1 VIEW: CPT

## 2023-12-12 PROCEDURE — 93005 ELECTROCARDIOGRAM TRACING: CPT | Performed by: EMERGENCY MEDICINE

## 2023-12-12 PROCEDURE — 99285 EMERGENCY DEPT VISIT HI MDM: CPT

## 2023-12-12 PROCEDURE — 80048 BASIC METABOLIC PNL TOTAL CA: CPT

## 2023-12-12 PROCEDURE — 93010 ELECTROCARDIOGRAM REPORT: CPT | Performed by: INTERNAL MEDICINE

## 2023-12-12 ASSESSMENT — ENCOUNTER SYMPTOMS
CHEST TIGHTNESS: 0
DIARRHEA: 0
WHEEZING: 0
COLOR CHANGE: 0
SHORTNESS OF BREATH: 0
NAUSEA: 0
VOMITING: 0
ABDOMINAL PAIN: 0

## 2023-12-12 ASSESSMENT — LIFESTYLE VARIABLES
HOW MANY STANDARD DRINKS CONTAINING ALCOHOL DO YOU HAVE ON A TYPICAL DAY: PATIENT DOES NOT DRINK
HOW MANY STANDARD DRINKS CONTAINING ALCOHOL DO YOU HAVE ON A TYPICAL DAY: PATIENT DOES NOT DRINK
HOW OFTEN DO YOU HAVE A DRINK CONTAINING ALCOHOL: NEVER

## 2023-12-12 ASSESSMENT — PAIN DESCRIPTION - LOCATION: LOCATION: CHEST

## 2023-12-12 ASSESSMENT — PAIN SCALES - GENERAL: PAINLEVEL_OUTOF10: 2

## 2023-12-12 NOTE — DISCHARGE INSTRUCTIONS
Workup in the emergency department is reassuring. Chest pain is thought to be related to upper gastrointestinal dysfunction rather than cardiac. Recommend following up with your primary doctor in 3 to 4 days for recheck. Please return with any worsening symptoms or concerns in the interim.

## 2023-12-12 NOTE — ED TRIAGE NOTES
Patient a 71 y/o Male reports to [facility] ED with c/c of chest pain. States it started this morning. Cardiac Hx of Aortic valve replacements.  Wore a Halter monitor for a week in the past.. Pain to the left chest.

## 2023-12-12 NOTE — ED PROVIDER NOTES
ill-appearing or diaphoretic. HENT:      Head: Normocephalic and atraumatic. Right Ear: External ear normal.      Left Ear: External ear normal.      Nose: Nose normal.   Eyes:      General: No scleral icterus. Right eye: No discharge. Left eye: No discharge. Extraocular Movements: Extraocular movements intact. Conjunctiva/sclera: Conjunctivae normal.   Cardiovascular:      Rate and Rhythm: Normal rate and regular rhythm. Pulses: Normal pulses. Heart sounds: Normal heart sounds. Pulmonary:      Effort: Pulmonary effort is normal.      Breath sounds: Normal breath sounds. Abdominal:      General: Abdomen is flat. Palpations: Abdomen is soft. Tenderness: There is no abdominal tenderness. Musculoskeletal:         General: Normal range of motion. Cervical back: Normal range of motion and neck supple. Skin:     General: Skin is warm and dry. Neurological:      General: No focal deficit present. Mental Status: He is alert and oriented to person, place, and time.    Psychiatric:         Mood and Affect: Mood normal.         Behavior: Behavior normal.          Procedures     Procedures    Orders Placed This Encounter   Procedures    XR CHEST PORTABLE    Basic Metabolic Panel    CBC with Auto Differential    Troponin    Cardiac Monitor - ED Only    Initiate Oxygen Therapy Protocol    Pulse oximetry, continuous    EKG 12 Lead    Saline lock IV        Medications given during this emergency department visit:  Medications - No data to display    Discharge Medication List as of 12/12/2023  4:13 PM           Past Medical History:   Diagnosis Date    Allergic rhinitis 4/23/2014    Aortic stenosis 10/16/2015    Arrhythmia     Arthritis     Atelectasis 7/10/2015    Dysphagia 5/21/2015    Mostly with \"stringy\" foods     Environmental allergies     GERD (gastroesophageal reflux disease)     Hiatal hernia     History of tuberculosis exposure     tested positive 30

## 2024-03-14 ENCOUNTER — OFFICE VISIT (OUTPATIENT)
Dept: FAMILY MEDICINE CLINIC | Facility: CLINIC | Age: 79
End: 2024-03-14

## 2024-03-14 VITALS
SYSTOLIC BLOOD PRESSURE: 126 MMHG | WEIGHT: 203.6 LBS | OXYGEN SATURATION: 98 % | HEIGHT: 74 IN | HEART RATE: 56 BPM | BODY MASS INDEX: 26.13 KG/M2 | TEMPERATURE: 97.9 F | DIASTOLIC BLOOD PRESSURE: 68 MMHG

## 2024-03-14 DIAGNOSIS — K21.9 GASTROESOPHAGEAL REFLUX DISEASE, UNSPECIFIED WHETHER ESOPHAGITIS PRESENT: ICD-10-CM

## 2024-03-14 DIAGNOSIS — I10 HYPERTENSION, UNSPECIFIED TYPE: Primary | ICD-10-CM

## 2024-03-14 DIAGNOSIS — I71.21 ANEURYSM OF ASCENDING AORTA WITHOUT RUPTURE (HCC): ICD-10-CM

## 2024-03-14 RX ORDER — OMEPRAZOLE 40 MG/1
40 CAPSULE, DELAYED RELEASE ORAL DAILY
Qty: 90 CAPSULE | Refills: 3 | Status: SHIPPED | OUTPATIENT
Start: 2024-03-14

## 2024-03-14 SDOH — ECONOMIC STABILITY: FOOD INSECURITY: WITHIN THE PAST 12 MONTHS, YOU WORRIED THAT YOUR FOOD WOULD RUN OUT BEFORE YOU GOT MONEY TO BUY MORE.: NEVER TRUE

## 2024-03-14 SDOH — ECONOMIC STABILITY: FOOD INSECURITY: WITHIN THE PAST 12 MONTHS, THE FOOD YOU BOUGHT JUST DIDN'T LAST AND YOU DIDN'T HAVE MONEY TO GET MORE.: NEVER TRUE

## 2024-03-14 SDOH — ECONOMIC STABILITY: INCOME INSECURITY: HOW HARD IS IT FOR YOU TO PAY FOR THE VERY BASICS LIKE FOOD, HOUSING, MEDICAL CARE, AND HEATING?: NOT HARD AT ALL

## 2024-03-14 ASSESSMENT — PATIENT HEALTH QUESTIONNAIRE - PHQ9
SUM OF ALL RESPONSES TO PHQ9 QUESTIONS 1 & 2: 0
SUM OF ALL RESPONSES TO PHQ QUESTIONS 1-9: 0
1. LITTLE INTEREST OR PLEASURE IN DOING THINGS: 0
SUM OF ALL RESPONSES TO PHQ QUESTIONS 1-9: 0
2. FEELING DOWN, DEPRESSED OR HOPELESS: 0

## 2024-03-14 ASSESSMENT — ENCOUNTER SYMPTOMS
DIARRHEA: 0
SHORTNESS OF BREATH: 0
ROS SKIN COMMENTS: SKIN LESIONS
ABDOMINAL PAIN: 0
WHEEZING: 0
BACK PAIN: 0
CONSTIPATION: 0
CHEST TIGHTNESS: 0

## 2024-03-14 NOTE — PROGRESS NOTES
Dariusz Laurent is a 78 y.o. male who presents today for the following:  Chief Complaint   Patient presents with    Hypertension     6 month follow up     Skin Problem     Would like mole on base of neck on R side looked at       Allergies   Allergen Reactions    Pseudoephedrine Hcl Other (See Comments)     Agitated & blisters on hands & feet.    Triprolidine-Pseudoephedrine Other (See Comments)     Make him hyper and the palms of his hands blister and peel    Valsartan Rash       Current Outpatient Medications   Medication Sig Dispense Refill    hydrALAZINE (APRESOLINE) 25 MG tablet Take 1 tablet by mouth 3 times daily as needed (Systolic pressure greater than 160 mmHg) 90 tablet 3    amLODIPine (NORVASC) 5 MG tablet Take 1 tablet by mouth daily 90 tablet 3    carvedilol (COREG) 3.125 MG tablet Take 1 tablet by mouth 2 times daily (Patient not taking: Reported on 11/28/2023) 180 tablet 3    omeprazole (PRILOSEC) 40 MG delayed release capsule Take 1 capsule by mouth daily 90 capsule 3    amoxicillin (AMOXIL) 500 MG tablet Take 4 tablets by mouth once      clotrimazole-betamethasone (LOTRISONE) 1-0.05 % cream Apply topically 2 times daily for up to two weeks to rash. (Patient not taking: Reported on 11/28/2023) 45 g 1    LUTEIN PO Take by mouth      Multiple Vitamins-Minerals (PRESERVISION AREDS PO) Take by mouth      fexofenadine (ALLEGRA) 180 MG tablet 1 tablet      Multiple Vitamins-Minerals (MULTIVITAMIN ADULT EXTRA C PO)       Multiple Vitamin (MULTI-DAY PO) Take by mouth      ascorbic acid (VITAMIN C) 500 MG tablet Take 2 tablets by mouth daily      vitamin D 25 MCG (1000 UT) CAPS Take 2 capsules by mouth daily       No current facility-administered medications for this visit.       Past Medical History:   Diagnosis Date    Allergic rhinitis 4/23/2014    Aortic stenosis 10/16/2015    Arrhythmia     Arthritis     Atelectasis 7/10/2015    Dysphagia 5/21/2015    Mostly with \"stringy\" foods     Environmental

## 2024-04-08 ENCOUNTER — PATIENT MESSAGE (OUTPATIENT)
Dept: FAMILY MEDICINE CLINIC | Facility: CLINIC | Age: 79
End: 2024-04-08

## 2024-04-08 DIAGNOSIS — G89.29 CHRONIC RIGHT-SIDED LOW BACK PAIN WITH RIGHT-SIDED SCIATICA: Primary | ICD-10-CM

## 2024-04-08 DIAGNOSIS — M54.41 CHRONIC RIGHT-SIDED LOW BACK PAIN WITH RIGHT-SIDED SCIATICA: Primary | ICD-10-CM

## 2024-04-08 NOTE — TELEPHONE ENCOUNTER
From: Dariusz Laurent  To: Dr. Aram Crook  Sent: 4/8/2024 9:32 AM EDT  Subject: Back and right hip    I’ve been laid up in bed for the last week with a bad lower back and right hip. Even with the stretches I’ve tried, I’ve gotten no relief. I’ve texted Eneida Dunn at [facility]. She will see me if you send in a prescription requesting her. Could you do that, please?    Thank you,  Dariusz Laurent

## 2024-04-15 ENCOUNTER — HOSPITAL ENCOUNTER (OUTPATIENT)
Dept: PHYSICAL THERAPY | Age: 79
Setting detail: RECURRING SERIES
Discharge: HOME | End: 2024-04-18
Payer: MEDICARE

## 2024-04-15 DIAGNOSIS — M54.59 OTHER LOW BACK PAIN: Primary | ICD-10-CM

## 2024-04-15 DIAGNOSIS — M25.551 HIP PAIN, RIGHT: ICD-10-CM

## 2024-04-15 DIAGNOSIS — M25.651 STIFFNESS OF HIP JOINT, RIGHT: ICD-10-CM

## 2024-04-15 DIAGNOSIS — M25.652 STIFFNESS OF HIP JOINT, LEFT: ICD-10-CM

## 2024-04-15 DIAGNOSIS — M62.81 MUSCLE WEAKNESS (GENERALIZED): ICD-10-CM

## 2024-04-15 PROCEDURE — 97161 PT EVAL LOW COMPLEX 20 MIN: CPT

## 2024-04-15 ASSESSMENT — PAIN SCALES - GENERAL: PAINLEVEL_OUTOF10: 3

## 2024-04-15 NOTE — PROGRESS NOTES
Dariusz Leyvarichardrenuka  : 1945  Primary: [facility] [employer]age (Medicare Managed)  Secondary:  [facility] [address] [place] [address]  Phone: [phone]  Fax: [phone] Plan Frequency: 2 times/week    Plan of Care/Certification Expiration Date: 24        Plan of Care/Certification Expiration Date:  Plan of Care/Certification Expiration Date: 24    Frequency/Duration:   Plan Frequency: 2 times/week      Time In/Out:   Time In: 1015  Time Out: 1110      PT Visit Info:         Visit Count:  1    OUTPATIENT PHYSICAL THERAPY:   Treatment Note 4/15/2024       Episode  (LBP)               Treatment Diagnosis:    Other low back pain  Muscle weakness (generalized)  Hip pain, right  Stiffness of hip joint, left  Stiffness of hip joint, right    Goals: (Goals have been discussed and agreed upon with patient.)  Short-Term Functional Goals: Time Frame: 4 weeks  Pt to report compliance with HEP  Pt to demonstrate fluid movement patterns without cues  Discharge Goals: Time Frame: 8 weeks  Pt to increase strength for sit to stand x 30 reps without UE assist  Pt to report yu all positions without increased pain  Pt to report restorative sleep patterns    Medical/Referring Diagnosis:    Chronic right-sided low back pain with right-sided sciatica [M54.41, G89.29]    Referring Physician:  Aram Crook MD MD Orders:  PT Eval and Treat   Return MD Appt:  24   Date of Onset:  Onset Date: 24     Allergies:   Pseudoephedrine hcl, Triprolidine-pseudoephedrine, and Valsartan  Restrictions/Precautions:   None      Interventions Planned (Treatment may consist of any combination of the following):   Balance Training, Endurance Training, Functional Mobility Training, Gait Training, Home Exercise Program (HEP), Manual Therapy, Pain Management, Range of Motion (ROM), Therapeutic Exercise/Strengthening, and Aquatic Therapy     Subjective Comments: pt presents with

## 2024-04-15 NOTE — THERAPY EVALUATION
allergies, GERD (gastroesophageal reflux disease), Hiatal hernia, History of tuberculosis exposure, Hypernatremia, Hypertension, Mononucleosis, Onychomycosis, S/P AVR (aortic valve replacement), Seborrheic keratosis, Severe aortic valve stenosis, and Thoracic aortic aneurysm (HCC).  Mr. Laurent  has a past surgical history that includes Cardiac valuve replacement (07/2015); Cardiac catheterization; cyst removal (Left); Vasectomy; and Colonoscopy.  Social History/Living Environment:   Type of Home: House: Two Story    Prior Level of Function/Work/Activity:   Employment Status: Retired     Learning:   Does the patient/guardian have any barriers to learning?: No barriers    Fall Risk Scale:   Cerda Total Score: 0    Dominant Side:  right handed      OBJECTIVE     Observation, Palpation, and Special Tests   Flat lumbar spine  Mild tenderness R > L PSIS  Tender R piriformis  Pos FADIR L > R  Tightness L > R hip flex/add     ROM   Trunk flex guarded, painful - 50%  Ext, B SB WNL  Marked hams tightness B  Mod piriformis tightness B  Decreased hip flex, IR/ER B     Strength   5/5 B LE myotomes     Functional Mobility, Balance, and Gait   Stairs - reciprocal with hand rail   SLS - > 30 sec L, 25 sec R   Sit to stand - UE assist, holds breath, rigid trunk   Gait - rigid trunk, no arm swing      ASSESSMENT   Initial Assessment:  pt presents with acute R LBP due to faulty movement patterns, dysfunctional breathing, stiffness, weakness.  He will benefit from skilled PT to address these deficits to return pt to premorbid status.  Therapy Problem List: (Impacting functional limitations):    Increased Pain, Decreased Strength, Decreased ROM, Decreased Transfer Abilities, Decreased Ambulation Ability/Technique, Decreased Functional Mobility, Decreased [place] with Home Exercise Program, Decreased Posture, Decreased Balance, and Decreased Activity Tolerance/Endurance*   Therapy Prognosis:   Good     Initial Assessment

## 2024-04-17 ENCOUNTER — HOSPITAL ENCOUNTER (OUTPATIENT)
Dept: PHYSICAL THERAPY | Age: 79
Setting detail: RECURRING SERIES
Discharge: HOME | End: 2024-04-20
Payer: MEDICARE

## 2024-04-17 PROCEDURE — 97113 AQUATIC THERAPY/EXERCISES: CPT

## 2024-04-17 NOTE — PROGRESS NOTES
Dariusz Castro Sarahrenuka  : 1945  Primary: [facility] [employer] Medicare Advantage (Medicare Managed)  Secondary:  [facility] [address] [place] [address]  Phone: [phone]  Fax: [phone] Plan Frequency: 2 times/week    Plan of Care/Certification Expiration Date: 24        Plan of Care/Certification Expiration Date:  Plan of Care/Certification Expiration Date: 24    Frequency/Duration:   Plan Frequency: 2 times/week      Time In/Out:   Time In: 1025  Time Out: 1135      PT Visit Info:         Visit Count:  2    OUTPATIENT PHYSICAL THERAPY:   Treatment Note 2024       Episode  (LBP)               Treatment Diagnosis:    Other low back pain  Muscle weakness (generalized)  Hip pain, right  Stiffness of hip joint, left  Stiffness of hip joint, right    Goals: (Goals have been discussed and agreed upon with patient.)  Short-Term Functional Goals: Time Frame: 4 weeks  Pt to report compliance with HEP  Pt to demonstrate fluid movement patterns without cues  Discharge Goals: Time Frame: 8 weeks  Pt to increase strength for sit to stand x 30 reps without UE assist  Pt to report yu all positions without increased pain  Pt to report restorative sleep patterns    Medical/Referring Diagnosis:    Chronic right-sided low back pain with right-sided sciatica [M54.41, G89.29]    Referring Physician:  Aram Crook MD MD Orders:  PT Eval and Treat   Return MD Appt:  24   Date of Onset:  Onset Date: 24     Allergies:   Pseudoephedrine hcl, Triprolidine-pseudoephedrine, and Valsartan  Restrictions/Precautions:   None      Interventions Planned (Treatment may consist of any combination of the following):   Balance Training, Endurance Training, Functional Mobility Training, Gait Training, Home Exercise Program (HEP), Manual Therapy, Pain Management, Range of Motion (ROM), Therapeutic Exercise/Strengthening, and Aquatic Therapy     Subjective Comments:  It is better but

## 2024-04-22 ENCOUNTER — HOSPITAL ENCOUNTER (OUTPATIENT)
Dept: PHYSICAL THERAPY | Age: 79
Setting detail: RECURRING SERIES
Discharge: HOME | End: 2024-04-25
Payer: MEDICARE

## 2024-04-22 PROCEDURE — 97113 AQUATIC THERAPY/EXERCISES: CPT

## 2024-04-22 NOTE — PROGRESS NOTES
trouble with my back tightening up when I sit but I am trying to sit more.   Initial Pain Level:: 1/10  Post Session Pain Level:  no increased pain with ex's  Medications Last Reviewed:  4/22/2024  Updated Objective Findings:      Observation, Palpation, and Special Tests   Flat lumbar spine  Mild tenderness R > L PSIS  Tender R piriformis  Pos FADIR L > R  Tightness L > R hip flex/add       ROM   Trunk flex guarded, painful - 50%  Ext, B SB WNL  Marked hams tightness B  Mod piriformis tightness B  Decreased hip flex, IR/ER B      Functional Mobility, Balance, and Gait   Stairs - reciprocal with hand rail   SLS - > 30 sec L, 25 sec R   Sit to stand - UE assist, holds breath, rigid trunk   Gait - rigid trunk, no arm swing     Outcome Measure:   Tool Used: Modified Oswestry Low Back Pain Questionnaire  Score:  Initial: 20/50   4/15/24 Most Recent: X/50 (Date: -- )   Interpretation of Score: Each section is scored on a 0-5 scale, 5 representing the greatest disability.  The scores of each section are added together for a total score of 50.   Treatment   THERAPEUTIC ACTIVITY: ( see below for minutes):  Therapeutic activities per grid below to improve mobility.  Required moderate verbal and manual cues to improve functional mobility .  THERAPEUTIC EXERCISE: (see below for minutes):  Exercises per grid below to improve strength.  Required moderate verbal and manual cues to promote proper body alignment, promote proper body posture, promote proper body mechanics and promote proper body breathing techniques.  Progressed resistance, range, repetitions and complexity of movement as indicated.  MANUAL THERAPY: (see below for minutes): Joint mobilization and Soft tissue mobilization was utilized and necessary because of the patient's restricted joint motion, painful spasm, loss of articular motion and restricted motion of soft tissue.   MODALITIES: (see below for minutes):      for pain modulation    AQUATIC THERAPY (see below 
5

## 2024-04-24 ENCOUNTER — HOSPITAL ENCOUNTER (OUTPATIENT)
Dept: PHYSICAL THERAPY | Age: 79
Setting detail: RECURRING SERIES
Discharge: HOME | End: 2024-04-27
Payer: MEDICARE

## 2024-04-24 PROCEDURE — 97113 AQUATIC THERAPY/EXERCISES: CPT

## 2024-04-24 NOTE — PROGRESS NOTES
below for minutes): Aquatic treatment performed per flow grid for Decreased muscle strength, Decreased endurance, Decreased static/dynamic balance and reactive control, Decreased activity endurance, Decompression, Ease of movement and Low impact and reduced weight bearing activity.                                          Date: 4/15/24 4/17/24  Visit 2 4/22/24  Visit 3 4/24/24  Visit 4    Modalities:                Manual Therapy: Not billable       STM R trunk/post hip, L ant hip                Aquatics Activities:  70 mins 65 mins 67 mins    GAIT (F/B/S/M)  3 laps 4 laps 4 laps    SLR gait  3 laps 4 laps 4 laps    Hamstring Curl gait   3 laps 4 laps 4 laps    Rockettes  3 laps 4 laps 4 laps    Squats  X 15   On step X 30  On step X 30  On step    Calf raises  X 15  On step X 30  On step X 30  On step    Hamstring stretch B  3 x 15 sec 3 x 15 sec 3 x 15 sec    Piriformis stretch B  3 x 15 sec 3 x 15 sec 3 x 15 sec    Single leg squats B    X 10    SLS B  X 3 X 3 X 5    Deep well  To fatigue To fatigue To fatigue                    Therapeutic Exercises: Not billable       Pt education, postural education, HEP, functional breathing        Seated trunk flex stretch  X 3      Piriformis stretch B  1 x 15 sec      H/K pelvic tilts    X 10     HEP: see hand out       Treatment/Session Summary:    Treatment Assessment:  Improved flex with stretching.  More sore from yard work but no pain after therapy.  Communication/Consultation:  None today  Equipment provided today:  None  Recommendations/Intent for next treatment session: Next visit will focus on stretching, strengthening as indicated.    Total Treatment Billable Duration:  67 minutes   Time In: 1103  Time Out: 1210    RAYSA ALVAREZ PT         Charge Capture  [employer] Portal  Appt Desk     Future Appointments   Date Time Provider Department Center   4/29/2024  1:30 PM Raysa Alvarez PT [facility]   5/1/2024  8:45 AM Raysa Alvarez PT [facility]   5/6/2024  8:45 AM

## 2024-04-29 ENCOUNTER — HOSPITAL ENCOUNTER (OUTPATIENT)
Dept: PHYSICAL THERAPY | Age: 79
Setting detail: RECURRING SERIES
Discharge: HOME | End: 2024-05-02
Payer: MEDICARE

## 2024-04-29 PROCEDURE — 97140 MANUAL THERAPY 1/> REGIONS: CPT

## 2024-04-29 PROCEDURE — 97113 AQUATIC THERAPY/EXERCISES: CPT

## 2024-04-29 NOTE — PROGRESS NOTES
Dariusz Leyvarichardrenuka  : 1945  Primary: [facility] [employer] Medicare Advantage (Medicare Managed)  Secondary:  [facility] @ [address] [place] [address]  Phone: [phone]  Fax: [phone] Plan Frequency: 2 times/week    Plan of Care/Certification Expiration Date: 24        Plan of Care/Certification Expiration Date:  Plan of Care/Certification Expiration Date: 24    Frequency/Duration:   Plan Frequency: 2 times/week      Time In/Out:   Time In: 0130  Time Out: 0246      PT Visit Info:         Visit Count:  5    OUTPATIENT PHYSICAL THERAPY:   Treatment Note 2024       Episode  (LBP)               Treatment Diagnosis:    Other low back pain  Muscle weakness (generalized)  Hip pain, right  Stiffness of hip joint, left  Stiffness of hip joint, right    Goals: (Goals have been discussed and agreed upon with patient.)  Short-Term Functional Goals: Time Frame: 4 weeks  Pt to report compliance with HEP  Pt to demonstrate fluid movement patterns without cues  Discharge Goals: Time Frame: 8 weeks  Pt to increase strength for sit to stand x 30 reps without UE assist  Pt to report yu all positions without increased pain  Pt to report restorative sleep patterns    Medical/Referring Diagnosis:    Chronic right-sided low back pain with right-sided sciatica [M54.41, G89.29]    Referring Physician:  Aarm Crook MD MD Orders:  PT Eval and Treat   Return MD Appt:  24   Date of Onset:  Onset Date: 24     Allergies:   Pseudoephedrine hcl, Triprolidine-pseudoephedrine, and Valsartan  Restrictions/Precautions:   None      Interventions Planned (Treatment may consist of any combination of the following):   Balance Training, Endurance Training, Functional Mobility Training, Gait Training, Home Exercise Program (HEP), Manual Therapy, Pain Management, Range of Motion (ROM), Therapeutic Exercise/Strengthening, and Aquatic Therapy     Subjective Comments: I am sitting more

## 2024-05-01 ENCOUNTER — HOSPITAL ENCOUNTER (OUTPATIENT)
Dept: PHYSICAL THERAPY | Age: 79
Setting detail: RECURRING SERIES
Discharge: HOME | End: 2024-05-04
Payer: MEDICARE

## 2024-05-01 PROCEDURE — 97140 MANUAL THERAPY 1/> REGIONS: CPT

## 2024-05-01 PROCEDURE — 97113 AQUATIC THERAPY/EXERCISES: CPT

## 2024-05-01 NOTE — PROGRESS NOTES
Dariusz Castro Sarahrenuka  : 1945  Primary: [facility] [employer] Medicare Advantage (Medicare Managed)  Secondary:  [facility] @ [address] [place] [address]  Phone: [phone]  Fax: [phone] Plan Frequency: 2 times/week    Plan of Care/Certification Expiration Date: 24        Plan of Care/Certification Expiration Date:  Plan of Care/Certification Expiration Date: 24    Frequency/Duration:   Plan Frequency: 2 times/week      Time In/Out:   Time In: 0845  Time Out: 1012      PT Visit Info:         Visit Count:  6    OUTPATIENT PHYSICAL THERAPY:   Treatment Note 2024       Episode  (LBP)               Treatment Diagnosis:    Other low back pain  Muscle weakness (generalized)  Hip pain, right  Stiffness of hip joint, left  Stiffness of hip joint, right    Goals: (Goals have been discussed and agreed upon with patient.)  Short-Term Functional Goals: Time Frame: 4 weeks  Pt to report compliance with HEP  Pt to demonstrate fluid movement patterns without cues  Discharge Goals: Time Frame: 8 weeks  Pt to increase strength for sit to stand x 30 reps without UE assist  Pt to report yu all positions without increased pain  Pt to report restorative sleep patterns    Medical/Referring Diagnosis:    Chronic right-sided low back pain with right-sided sciatica [M54.41, G89.29]    Referring Physician:  Aram Crook MD MD Orders:  PT Eval and Treat   Return MD Appt:  24   Date of Onset:  Onset Date: 24     Allergies:   Pseudoephedrine hcl, Triprolidine-pseudoephedrine, and Valsartan  Restrictions/Precautions:   None      Interventions Planned (Treatment may consist of any combination of the following):   Balance Training, Endurance Training, Functional Mobility Training, Gait Training, Home Exercise Program (HEP), Manual Therapy, Pain Management, Range of Motion (ROM), Therapeutic Exercise/Strengthening, and Aquatic Therapy     Subjective Comments: I still have that

## 2024-05-06 ENCOUNTER — HOSPITAL ENCOUNTER (OUTPATIENT)
Dept: PHYSICAL THERAPY | Age: 79
Setting detail: RECURRING SERIES
Discharge: HOME | End: 2024-05-09
Payer: MEDICARE

## 2024-05-06 PROCEDURE — 97113 AQUATIC THERAPY/EXERCISES: CPT

## 2024-05-06 NOTE — PROGRESS NOTES
treatment performed per flow grid for Decreased muscle strength, Decreased endurance, Decreased static/dynamic balance and reactive control, Decreased activity endurance, Decompression, Ease of movement and Low impact and reduced weight bearing activity.                                          Date: 4/29/24  Visit 5 5/1/24  Visit 6 5/6/24  Visit 7   Modalities:            Manual Therapy: 10 mins 10 mins    STM R trunk/post hip Prone, S/L prone          Aquatics Activities: 66 mins 65 mins 65 mins   GAIT (F/B/S/M) 4 laps 4 laps 4 laps   SLR gait 4 laps 4 laps 4 laps   Hamstring Curl gait  4 laps 4 laps 4 laps   Rockettes 4 laps 4 laps 4 laps   Squats X 30  On step X 30  On step X 30  On step   Calf raises X 30  On step X 30  On step X 30  On step   Hamstring stretch B 3 x 15 sec 3 x 15 sec 3 x 15 sec   Piriformis stretch B 3 x 15 sec 3 x 15 sec 3 x 15 sec   Single leg squats B X 20 X 20 X 20   SLS B X 5 X 5 X 5   Deep well To fatigue To fatigue To fatigue               Therapeutic Exercises:      Pt education, postural education, HEP, functional breathing      Seated trunk flex stretch      Piriformis stretch B Passively supine passively passively   Trunk rotation stretch  Passively  supine passively passively   H/K pelvic tilts       HEP: see hand out       Treatment/Session Summary:    Treatment Assessment:   Free fluid trunk motion without pain.  Much improved.  Communication/Consultation:  None today  Equipment provided today:  None  Recommendations/Intent for next treatment session: Next visit will focus on stretching, strengthening as indicated.    Total Treatment Billable Duration:  65 minutes   Time In: 0845  Time Out: 0950    RAYSA ALVAREZ PT         Charge Capture  [employer] Portal  Appt Desk     Future Appointments   Date Time Provider Department Center   5/8/2024  8:45 AM Raysa Alvarez PT [facility]   5/13/2024  8:45 AM Raysa Alvarez PT [facility]   5/15/2024 10:15 AM Raysa Alvarez PT [facility]

## 2024-05-08 ENCOUNTER — HOSPITAL ENCOUNTER (OUTPATIENT)
Dept: PHYSICAL THERAPY | Age: 79
Setting detail: RECURRING SERIES
Discharge: HOME | End: 2024-05-11
Payer: MEDICARE

## 2024-05-08 PROCEDURE — 97113 AQUATIC THERAPY/EXERCISES: CPT

## 2024-05-08 NOTE — PROGRESS NOTES
Dariusz Leyvarichardrenuka  : 1945  Primary: [facility] [employer] Medicare Advantage (Medicare Managed)  Secondary:  [facility] @ [address] [place] [address]  Phone: [phone]  Fax: [phone] Plan Frequency: 2 times/week    Plan of Care/Certification Expiration Date: 24        Plan of Care/Certification Expiration Date:  Plan of Care/Certification Expiration Date: 24    Frequency/Duration:   Plan Frequency: 2 times/week      Time In/Out:   Time In: 0840  Time Out: 1012      PT Visit Info:         Visit Count:  8    OUTPATIENT PHYSICAL THERAPY:   Treatment Note and Progress Note 2024       Episode  (LBP)               Treatment Diagnosis:    Other low back pain  Muscle weakness (generalized)  Hip pain, right  Stiffness of hip joint, left  Stiffness of hip joint, right    Goals: (Goals have been discussed and agreed upon with patient.)  Short-Term Functional Goals: Time Frame: 4 weeks  Pt to report compliance with HEP - MET  Pt to demonstrate fluid movement patterns without cues - MET  Discharge Goals: Time Frame: 8 weeks  Pt to increase strength for sit to stand x 30 reps without UE assist - MET  Pt to report yu all positions without increased pain - ongoing  Pt to report restorative sleep patterns - MET    Medical/Referring Diagnosis:    Chronic right-sided low back pain with right-sided sciatica [M54.41, G89.29]    Referring Physician:  Aram Crook MD MD Orders:  PT Eval and Treat   Return MD Appt:  24   Date of Onset:  Onset Date: 24     Allergies:   Pseudoephedrine hcl, Triprolidine-pseudoephedrine, and Valsartan  Restrictions/Precautions:   None      Interventions Planned (Treatment may consist of any combination of the following):   Balance Training, Endurance Training, Functional Mobility Training, Gait Training, Home Exercise Program (HEP), Manual Therapy, Pain Management, Range of Motion (ROM), Therapeutic Exercise/Strengthening, and Aquatic

## 2024-05-13 ENCOUNTER — HOSPITAL ENCOUNTER (OUTPATIENT)
Dept: PHYSICAL THERAPY | Age: 79
Setting detail: RECURRING SERIES
Discharge: HOME | End: 2024-05-16
Payer: MEDICARE

## 2024-05-13 PROCEDURE — 97113 AQUATIC THERAPY/EXERCISES: CPT

## 2024-05-13 NOTE — PROGRESS NOTES
Subjective Comments: It is feeling better these days.  Initial Pain Level:: 2/10  Post Session Pain Level:  no increased pain with ex's  Medications Last Reviewed:  5/13/2024  Updated Objective Findings:  5/8/24    Observation, Palpation, and Special Tests   Tender R piriformis  Pos FADIR L > R  Tightness L > R hip flex/add       ROM   Marked hams tightness B  Mod piriformis tightness B  Decreased hip flex, IR/ER B      Functional Mobility, Balance, and Gait   Stairs - reciprocal with hand rail   SLS - > 30 sec L, 25 sec R   Gait - rigid trunk, no arm swing     Outcome Measure:   Tool Used: Modified Oswestry Low Back Pain Questionnaire  Score:  Initial: 20/50   4/15/24 Most Recent: 6/50 (Date: 5/8/24)   Interpretation of Score: Each section is scored on a 0-5 scale, 5 representing the greatest disability.  The scores of each section are added together for a total score of 50.   Treatment   THERAPEUTIC ACTIVITY: ( see below for minutes):  Therapeutic activities per grid below to improve mobility.  Required moderate verbal and manual cues to improve functional mobility .  THERAPEUTIC EXERCISE: (see below for minutes):  Exercises per grid below to improve strength.  Required moderate verbal and manual cues to promote proper body alignment, promote proper body posture, promote proper body mechanics and promote proper body breathing techniques.  Progressed resistance, range, repetitions and complexity of movement as indicated.  MANUAL THERAPY: (see below for minutes): Joint mobilization and Soft tissue mobilization was utilized and necessary because of the patient's restricted joint motion, painful spasm, loss of articular motion and restricted motion of soft tissue.   MODALITIES: (see below for minutes):      for pain modulation    AQUATIC THERAPY (see below for minutes): Aquatic treatment performed per flow grid for Decreased muscle strength, Decreased endurance, Decreased static/dynamic balance and reactive

## 2024-05-15 ENCOUNTER — HOSPITAL ENCOUNTER (OUTPATIENT)
Dept: PHYSICAL THERAPY | Age: 79
Setting detail: RECURRING SERIES
Discharge: HOME | End: 2024-05-18
Payer: MEDICARE

## 2024-05-15 PROCEDURE — 97140 MANUAL THERAPY 1/> REGIONS: CPT

## 2024-05-15 PROCEDURE — 97113 AQUATIC THERAPY/EXERCISES: CPT

## 2024-05-15 NOTE — DISCHARGE SUMMARY
Dariusz Leyvarichardrenuka  : 1945  Primary: [facility] [employer] Medicare Advantage (Medicare Managed)  Secondary:  [facility] @ [address] [place] [address]  Phone: [phone]  Fax: [phone] Plan Frequency: 2 times/week    Plan of Care/Certification Expiration Date: 24        Plan of Care/Certification Expiration Date:  Plan of Care/Certification Expiration Date: 24    Frequency/Duration:   Plan Frequency: 2 times/week      Time In/Out:   Time In: 1015  Time Out: 1127      PT Visit Info:         Visit Count:  10    OUTPATIENT PHYSICAL THERAPY:   Treatment Note and Discharge Summary5/15/2024       Episode  (LBP)               Treatment Diagnosis:    Other low back pain  Muscle weakness (generalized)  Hip pain, right  Stiffness of hip joint, left  Stiffness of hip joint, right    Goals: (Goals have been discussed and agreed upon with patient.)  Short-Term Functional Goals: Time Frame: 4 weeks  Pt to report compliance with HEP - MET  Pt to demonstrate fluid movement patterns without cues - MET  Discharge Goals: Time Frame: 8 weeks  Pt to increase strength for sit to stand x 30 reps without UE assist - MET  Pt to report yu all positions without increased pain - MET  Pt to report restorative sleep patterns - MET    Medical/Referring Diagnosis:    Chronic right-sided low back pain with right-sided sciatica [M54.41, G89.29]    Referring Physician:  Aram Crook MD MD Orders:  PT Eval and Treat   Return MD Appt:  24   Date of Onset:  Onset Date: 24     Allergies:   Pseudoephedrine hcl, Triprolidine-pseudoephedrine, and Valsartan  Restrictions/Precautions:   None      Interventions Planned (Treatment may consist of any combination of the following):   Balance Training, Endurance Training, Functional Mobility Training, Gait Training, Home Exercise Program (HEP), Manual Therapy, Pain Management, Range of Motion (ROM), Therapeutic Exercise/Strengthening, and Aquatic

## 2024-05-20 ENCOUNTER — APPOINTMENT (OUTPATIENT)
Dept: PHYSICAL THERAPY | Age: 79
End: 2024-05-20
Payer: MEDICARE

## 2024-05-22 ENCOUNTER — APPOINTMENT (OUTPATIENT)
Dept: PHYSICAL THERAPY | Age: 79
End: 2024-05-22
Payer: MEDICARE

## 2024-05-28 ENCOUNTER — APPOINTMENT (OUTPATIENT)
Dept: PHYSICAL THERAPY | Age: 79
End: 2024-05-28
Payer: MEDICARE

## 2024-05-29 ENCOUNTER — OFFICE VISIT (OUTPATIENT)
Age: 79
End: 2024-05-29
Payer: MEDICARE

## 2024-05-29 VITALS
HEIGHT: 74 IN | WEIGHT: 200.3 LBS | BODY MASS INDEX: 25.7 KG/M2 | HEART RATE: 72 BPM | DIASTOLIC BLOOD PRESSURE: 80 MMHG | SYSTOLIC BLOOD PRESSURE: 132 MMHG

## 2024-05-29 DIAGNOSIS — Q25.40 ABNORMALITY OF THORACIC AORTA: ICD-10-CM

## 2024-05-29 DIAGNOSIS — I49.3 PVC (PREMATURE VENTRICULAR CONTRACTION): Primary | ICD-10-CM

## 2024-05-29 DIAGNOSIS — Z95.2 S/P AVR (AORTIC VALVE REPLACEMENT): ICD-10-CM

## 2024-05-29 DIAGNOSIS — I77.810 ASCENDING AORTA DILATATION (HCC): ICD-10-CM

## 2024-05-29 PROCEDURE — 1123F ACP DISCUSS/DSCN MKR DOCD: CPT | Performed by: INTERNAL MEDICINE

## 2024-05-29 PROCEDURE — 3075F SYST BP GE 130 - 139MM HG: CPT | Performed by: INTERNAL MEDICINE

## 2024-05-29 PROCEDURE — 3079F DIAST BP 80-89 MM HG: CPT | Performed by: INTERNAL MEDICINE

## 2024-05-29 PROCEDURE — 99214 OFFICE O/P EST MOD 30 MIN: CPT | Performed by: INTERNAL MEDICINE

## 2024-05-29 ASSESSMENT — ENCOUNTER SYMPTOMS
ABDOMINAL PAIN: 0
COUGH: 0
STRIDOR: 0
APHONIA: 0
EYE PAIN: 0
NAIL CHANGES: 0

## 2024-05-29 NOTE — PROGRESS NOTES
[facility], [address], [address]  [place] [address]  PHONE: [phone]    SUBJECTIVE:   Dariusz Laurent is a 78 y.o. male 1945   seen for a follow up visit regarding the following:     Chief Complaint   Patient presents with    6 Month Follow-Up    Hypertension         History of present illness: 78 y.o. male presented for follow-up 5/29/24 history of bioprosthetic aortic valve last valve gradient stable.  He has a history of dilated aorta as well.  We discussed limiting isometric exercise.  He has had back pain since his last appointment and has completed physical therapy    Cardiac history:  7/8/15 (Dr Larsen) AORTIC VALVE REPLACEMENT (AVR)with 25 mm Tilley bovine pericardial valve; ascending aortoplasty  9/16/2021 CTA chest no significant interval change stable appearance of aorta 4.5 cm  11/18/2021 echocardiogram ejection fraction 60 to 65% prosthetic aortic valve mean gradient 18.6 mmHg moderate tricuspid regurgitation dilated left atrium aortic root dilatation 4.12 cm  9/17/2023 CT aorta 4.8 x 4.8 cm.  12/7/2023 echocardiogram ejection fraction 64% bioprosthetic aortic valve mean gradient 15 mmHg mitral valve mild to moderate regurgitation RVSP 34 mmHg    Assessment:   Thoracic aortic aneurysm  The 2022 American College of Cardiology/American Heart Association (ACC/AHA) aortic disease guideline provides recommendations on the diagnosis, evaluation, medical therapy, endovascular and surgical intervention, and long-term surveillance of patients with aortic disease   After treatment of TAA with TEVAR, surveillance imaging with CT is recommended after 1 month and 12 months, and then annually if stable; MRI is a reasonable alternative to CT.  After open repair of TAA, CT or MRI within 1 year and then every 5 years is reasonable in the absence of residual aortopathy; annual imaging is reasonable if there is residual aortopathy or abnormal findings on surveillance imaging  Status

## 2024-05-30 ENCOUNTER — APPOINTMENT (OUTPATIENT)
Dept: PHYSICAL THERAPY | Age: 79
End: 2024-05-30
Payer: MEDICARE

## 2024-06-17 ENCOUNTER — HOSPITAL ENCOUNTER (OUTPATIENT)
Dept: CT IMAGING | Age: 79
Discharge: HOME | End: 2024-06-20
Attending: INTERNAL MEDICINE
Payer: MEDICARE

## 2024-06-17 DIAGNOSIS — I77.810 ASCENDING AORTA DILATATION (HCC): ICD-10-CM

## 2024-06-17 DIAGNOSIS — Q25.40 ABNORMALITY OF THORACIC AORTA: ICD-10-CM

## 2024-06-17 LAB — CREAT BLD-MCNC: 0.99 MG/DL (ref 0.8–1.5)

## 2024-06-17 PROCEDURE — 6360000004 HC RX CONTRAST MEDICATION: Performed by: INTERNAL MEDICINE

## 2024-06-17 PROCEDURE — 71275 CT ANGIOGRAPHY CHEST: CPT | Performed by: RADIOLOGY

## 2024-06-17 PROCEDURE — 82565 ASSAY OF CREATININE: CPT

## 2024-06-17 PROCEDURE — 71275 CT ANGIOGRAPHY CHEST: CPT

## 2024-06-17 RX ADMIN — IOPAMIDOL 75 ML: 755 INJECTION, SOLUTION INTRAVENOUS at 10:42

## 2024-06-22 NOTE — RESULT ENCOUNTER NOTE
Your most recent CT scan showed stable aortic dimensions.    Please let me know if you have additional questions or concerns.    Aguila Paris MD

## 2024-07-28 NOTE — PROGRESS NOTES
Dariusz Laurent is a 78 y.o. male who presents today for the following:  Chief Complaint   Patient presents with    Cyst     Cyst on R shoulder blade        Allergies   Allergen Reactions    Pseudoephedrine Hcl Other (See Comments)     Agitated & blisters on hands & feet.    Triprolidine-Pseudoephedrine Other (See Comments)     Make him hyper and the palms of his hands blister and peel    Valsartan Rash       Current Outpatient Medications   Medication Sig Dispense Refill    Glucosamine-Chondroitin (GLUCOSAMINE CHONDROITIN COMPLX) 500-250 MG CAPS Take by mouth      omeprazole (PRILOSEC) 40 MG delayed release capsule Take 1 capsule by mouth daily 90 capsule 3    hydrALAZINE (APRESOLINE) 25 MG tablet Take 1 tablet by mouth 3 times daily as needed (Systolic pressure greater than 160 mmHg) (Patient not taking: Reported on 5/29/2024) 90 tablet 3    amLODIPine (NORVASC) 5 MG tablet Take 1 tablet by mouth daily 90 tablet 3    amoxicillin (AMOXIL) 500 MG tablet Take 4 tablets by mouth once Prior to dental      LUTEIN PO Take by mouth      Multiple Vitamins-Minerals (PRESERVISION AREDS PO) Take by mouth      fexofenadine (ALLEGRA) 180 MG tablet 1 tablet      Multiple Vitamins-Minerals (MULTIVITAMIN ADULT EXTRA C PO)       Multiple Vitamin (MULTI-DAY PO) Take by mouth      ascorbic acid (VITAMIN C) 500 MG tablet Take 2 tablets by mouth daily      vitamin D 25 MCG (1000 UT) CAPS Take 2 capsules by mouth daily       No current facility-administered medications for this visit.       Past Medical History:   Diagnosis Date    Allergic rhinitis 4/23/2014    Aortic stenosis 10/16/2015    Arrhythmia     Arthritis     Atelectasis 7/10/2015    Dysphagia 5/21/2015    Mostly with \"stringy\" foods     Environmental allergies     GERD (gastroesophageal reflux disease)     Hiatal hernia     History of tuberculosis exposure     tested positive 30 years ago     Hypernatremia 7/10/2015    Hypertension     Mononucleosis     Onychomycosis

## 2024-07-29 ENCOUNTER — OFFICE VISIT (OUTPATIENT)
Dept: FAMILY MEDICINE CLINIC | Facility: CLINIC | Age: 79
End: 2024-07-29
Payer: MEDICARE

## 2024-07-29 VITALS
HEART RATE: 63 BPM | HEIGHT: 74 IN | TEMPERATURE: 97.7 F | WEIGHT: 202 LBS | SYSTOLIC BLOOD PRESSURE: 124 MMHG | OXYGEN SATURATION: 99 % | BODY MASS INDEX: 25.93 KG/M2 | DIASTOLIC BLOOD PRESSURE: 72 MMHG

## 2024-07-29 DIAGNOSIS — L72.0 EPIDERMAL CYST: Primary | ICD-10-CM

## 2024-07-29 PROCEDURE — G8419 CALC BMI OUT NRM PARAM NOF/U: HCPCS | Performed by: FAMILY MEDICINE

## 2024-07-29 PROCEDURE — 1036F TOBACCO NON-USER: CPT | Performed by: FAMILY MEDICINE

## 2024-07-29 PROCEDURE — 3078F DIAST BP <80 MM HG: CPT | Performed by: FAMILY MEDICINE

## 2024-07-29 PROCEDURE — 3074F SYST BP LT 130 MM HG: CPT | Performed by: FAMILY MEDICINE

## 2024-07-29 PROCEDURE — G8427 DOCREV CUR MEDS BY ELIG CLIN: HCPCS | Performed by: FAMILY MEDICINE

## 2024-07-29 PROCEDURE — 99213 OFFICE O/P EST LOW 20 MIN: CPT | Performed by: FAMILY MEDICINE

## 2024-07-29 PROCEDURE — 1123F ACP DISCUSS/DSCN MKR DOCD: CPT | Performed by: FAMILY MEDICINE

## 2024-07-29 RX ORDER — SULFAMETHOXAZOLE AND TRIMETHOPRIM 800; 160 MG/1; MG/1
1 TABLET ORAL 2 TIMES DAILY
Qty: 20 TABLET | Refills: 0 | Status: SHIPPED | OUTPATIENT
Start: 2024-07-29 | End: 2024-08-08

## 2024-07-29 ASSESSMENT — ENCOUNTER SYMPTOMS
WHEEZING: 0
CHEST TIGHTNESS: 0
BACK PAIN: 0
ABDOMINAL PAIN: 0
CONSTIPATION: 0
DIARRHEA: 0
SHORTNESS OF BREATH: 0

## 2024-08-14 ENCOUNTER — TELEPHONE (OUTPATIENT)
Dept: FAMILY MEDICINE CLINIC | Facility: CLINIC | Age: 79
End: 2024-08-14

## 2024-08-14 NOTE — TELEPHONE ENCOUNTER
Brittani with [facility] called, referral for pt was received, but the referral isn't legible. Please resend referral.     Ph:[phone]

## 2024-09-10 SDOH — HEALTH STABILITY: PHYSICAL HEALTH: ON AVERAGE, HOW MANY MINUTES DO YOU ENGAGE IN EXERCISE AT THIS LEVEL?: 60 MIN

## 2024-09-10 SDOH — HEALTH STABILITY: PHYSICAL HEALTH: ON AVERAGE, HOW MANY DAYS PER WEEK DO YOU ENGAGE IN MODERATE TO STRENUOUS EXERCISE (LIKE A BRISK WALK)?: 5 DAYS

## 2024-09-10 ASSESSMENT — LIFESTYLE VARIABLES
HOW MANY STANDARD DRINKS CONTAINING ALCOHOL DO YOU HAVE ON A TYPICAL DAY: 1 OR 2
HOW MANY STANDARD DRINKS CONTAINING ALCOHOL DO YOU HAVE ON A TYPICAL DAY: 1
HOW OFTEN DO YOU HAVE A DRINK CONTAINING ALCOHOL: MONTHLY OR LESS
HOW OFTEN DO YOU HAVE SIX OR MORE DRINKS ON ONE OCCASION: 1
HOW OFTEN DO YOU HAVE A DRINK CONTAINING ALCOHOL: 2

## 2024-09-10 ASSESSMENT — PATIENT HEALTH QUESTIONNAIRE - PHQ9
SUM OF ALL RESPONSES TO PHQ QUESTIONS 1-9: 0
SUM OF ALL RESPONSES TO PHQ QUESTIONS 1-9: 0
2. FEELING DOWN, DEPRESSED OR HOPELESS: NOT AT ALL
SUM OF ALL RESPONSES TO PHQ QUESTIONS 1-9: 0
SUM OF ALL RESPONSES TO PHQ QUESTIONS 1-9: 0
1. LITTLE INTEREST OR PLEASURE IN DOING THINGS: NOT AT ALL
SUM OF ALL RESPONSES TO PHQ9 QUESTIONS 1 & 2: 0

## 2024-09-13 ENCOUNTER — OFFICE VISIT (OUTPATIENT)
Dept: FAMILY MEDICINE CLINIC | Facility: CLINIC | Age: 79
End: 2024-09-13

## 2024-09-13 VITALS
SYSTOLIC BLOOD PRESSURE: 124 MMHG | OXYGEN SATURATION: 97 % | BODY MASS INDEX: 25.81 KG/M2 | DIASTOLIC BLOOD PRESSURE: 70 MMHG | WEIGHT: 201 LBS | HEART RATE: 61 BPM

## 2024-09-13 DIAGNOSIS — I10 HYPERTENSION, UNSPECIFIED TYPE: Primary | ICD-10-CM

## 2024-09-13 DIAGNOSIS — Z95.2 S/P AVR (AORTIC VALVE REPLACEMENT): ICD-10-CM

## 2024-09-13 DIAGNOSIS — K21.9 GASTROESOPHAGEAL REFLUX DISEASE, UNSPECIFIED WHETHER ESOPHAGITIS PRESENT: ICD-10-CM

## 2024-09-13 DIAGNOSIS — Z00.00 MEDICARE ANNUAL WELLNESS VISIT, SUBSEQUENT: ICD-10-CM

## 2024-09-13 DIAGNOSIS — I10 HYPERTENSION, UNSPECIFIED TYPE: ICD-10-CM

## 2024-09-13 LAB
ALBUMIN SERPL-MCNC: 4 G/DL (ref 3.2–4.6)
ALBUMIN/GLOB SERPL: 1.6 (ref 1–1.9)
ALP SERPL-CCNC: 53 U/L (ref 40–129)
ALT SERPL-CCNC: 23 U/L (ref 12–65)
ANION GAP SERPL CALC-SCNC: 9 MMOL/L (ref 9–18)
AST SERPL-CCNC: 31 U/L (ref 15–37)
BASOPHILS # BLD: 0.1 K/UL (ref 0–0.2)
BASOPHILS NFR BLD: 1 % (ref 0–2)
BILIRUB SERPL-MCNC: 0.3 MG/DL (ref 0–1.2)
BUN SERPL-MCNC: 20 MG/DL (ref 8–23)
CALCIUM SERPL-MCNC: 9.6 MG/DL (ref 8.8–10.2)
CHLORIDE SERPL-SCNC: 106 MMOL/L (ref 98–107)
CHOLEST SERPL-MCNC: 165 MG/DL (ref 0–200)
CO2 SERPL-SCNC: 26 MMOL/L (ref 20–28)
CREAT SERPL-MCNC: 0.95 MG/DL (ref 0.8–1.3)
DIFFERENTIAL METHOD BLD: ABNORMAL
EOSINOPHIL # BLD: 0.1 K/UL (ref 0–0.8)
EOSINOPHIL NFR BLD: 1 % (ref 0.5–7.8)
ERYTHROCYTE [DISTWIDTH] IN BLOOD BY AUTOMATED COUNT: 12.7 % (ref 11.9–14.6)
GLOBULIN SER CALC-MCNC: 2.5 G/DL (ref 2.3–3.5)
GLUCOSE SERPL-MCNC: 106 MG/DL (ref 70–99)
HCT VFR BLD AUTO: 43.9 % (ref 41.1–50.3)
HDLC SERPL-MCNC: 71 MG/DL (ref 40–60)
HDLC SERPL: 2.3 (ref 0–5)
HGB BLD-MCNC: 14.8 G/DL (ref 13.6–17.2)
IMM GRANULOCYTES # BLD AUTO: 0 K/UL (ref 0–0.5)
IMM GRANULOCYTES NFR BLD AUTO: 0 % (ref 0–5)
LDLC SERPL CALC-MCNC: 72 MG/DL (ref 0–100)
LYMPHOCYTES # BLD: 1.3 K/UL (ref 0.5–4.6)
LYMPHOCYTES NFR BLD: 20 % (ref 13–44)
MCH RBC QN AUTO: 30.9 PG (ref 26.1–32.9)
MCHC RBC AUTO-ENTMCNC: 33.7 G/DL (ref 31.4–35)
MCV RBC AUTO: 91.6 FL (ref 82–102)
MONOCYTES # BLD: 0.6 K/UL (ref 0.1–1.3)
MONOCYTES NFR BLD: 10 % (ref 4–12)
NEUTS SEG # BLD: 4.3 K/UL (ref 1.7–8.2)
NEUTS SEG NFR BLD: 68 % (ref 43–78)
NRBC # BLD: 0 K/UL (ref 0–0.2)
PLATELET # BLD AUTO: 139 K/UL (ref 150–450)
PMV BLD AUTO: 10.9 FL (ref 9.4–12.3)
POTASSIUM SERPL-SCNC: 4.6 MMOL/L (ref 3.5–5.1)
PROT SERPL-MCNC: 6.5 G/DL (ref 6.3–8.2)
RBC # BLD AUTO: 4.79 M/UL (ref 4.23–5.6)
SODIUM SERPL-SCNC: 141 MMOL/L (ref 136–145)
TRIGL SERPL-MCNC: 108 MG/DL (ref 0–150)
VLDLC SERPL CALC-MCNC: 22 MG/DL (ref 6–23)
WBC # BLD AUTO: 6.4 K/UL (ref 4.3–11.1)

## 2024-09-13 RX ORDER — OMEPRAZOLE 40 MG/1
40 CAPSULE, DELAYED RELEASE ORAL DAILY
Qty: 90 CAPSULE | Refills: 3 | Status: SHIPPED | OUTPATIENT
Start: 2024-09-13

## 2024-09-13 RX ORDER — DOXYCYCLINE 100 MG/1
100 CAPSULE ORAL
COMMUNITY
Start: 2024-08-22

## 2024-09-13 ASSESSMENT — ENCOUNTER SYMPTOMS
ABDOMINAL PAIN: 0
CONSTIPATION: 0
ROS SKIN COMMENTS: SKIN LESIONS
SHORTNESS OF BREATH: 0
CHEST TIGHTNESS: 0
BACK PAIN: 0
WHEEZING: 0
DIARRHEA: 0

## 2024-10-01 NOTE — PROGRESS NOTES
[address] [place], [place]  [address], [address] [facility], [address] [place] [address]  PHONE: [phone]    SUBJECTIVE:   Aisha Blue is a 78 y.o. male 1945   seen for a follow up visit regarding the following:     Chief Complaint   Patient presents with    Hypertension         History of present illness: 78 y.o. male presented for follow-up 6/5/23     Cardiac history:  7/8/15 (Dr Adrián Mayes) [address] (AVR)with 25 mm [place] bovine pericardial valve; ascending aortoplasty  9/16/2021 CTA chest no significant interval change stable appearance of aorta 4.5 cm  11/18/2021 echocardiogram ejection fraction 60 to 65% prosthetic aortic valve mean gradient 18.6 mmHg moderate tricuspid regurgitation dilated left atrium aortic root dilatation 4.12 cm    Assessment:   Status post AVR  Aortic disorder  The 2022 American College of Cardiology/American Heart Association (ACC/AHA) aortic disease guideline provides recommendations on the diagnosis, evaluation, medical therapy, endovascular and surgical intervention, and long-term surveillance of patients with aortic disease   After treatment of TAA with TEVAR, surveillance imaging with CT is recommended after 1 month and 12 months, and then annually if stable; MRI is a reasonable alternative to CT. After open repair of TAA, CT or MRI within 1 year and then every 5 years is reasonable in the absence of residual aortopathy; annual imaging is reasonable if there is residual aortopathy or abnormal findings on surveillance imaging.   Discussed physical activity recommendations provided in patient instructions  Hypertension  Goal /80  Allergy to ARB   Patient will measure home BP   BB may be option in the future           Current Outpatient Medications   Medication Sig    omeprazole (PRILOSEC) 40 MG delayed release capsule Take 1 capsule by mouth daily    clotrimazole-betamethasone (LOTRISONE) 1-0.05 % cream Apply topically 2 times daily for up to two weeks to Spoke to patient agreeable for referral to cardiology and will add 81 mg ASA to medication regimen.  No further questions or concerns

## 2024-11-25 ENCOUNTER — OFFICE VISIT (OUTPATIENT)
Age: 79
End: 2024-11-25
Payer: MEDICARE

## 2024-11-25 VITALS
DIASTOLIC BLOOD PRESSURE: 78 MMHG | BODY MASS INDEX: 26.95 KG/M2 | SYSTOLIC BLOOD PRESSURE: 138 MMHG | WEIGHT: 210 LBS | HEIGHT: 74 IN | HEART RATE: 63 BPM

## 2024-11-25 DIAGNOSIS — R01.1 HEART MURMUR: ICD-10-CM

## 2024-11-25 DIAGNOSIS — I49.3 PVC (PREMATURE VENTRICULAR CONTRACTION): Primary | ICD-10-CM

## 2024-11-25 DIAGNOSIS — I10 HYPERTENSION, UNSPECIFIED TYPE: ICD-10-CM

## 2024-11-25 DIAGNOSIS — Q25.40 ABNORMALITY OF THORACIC AORTA: ICD-10-CM

## 2024-11-25 DIAGNOSIS — Z95.2 S/P AVR (AORTIC VALVE REPLACEMENT): ICD-10-CM

## 2024-11-25 PROCEDURE — 93000 ELECTROCARDIOGRAM COMPLETE: CPT | Performed by: INTERNAL MEDICINE

## 2024-11-25 PROCEDURE — 99214 OFFICE O/P EST MOD 30 MIN: CPT | Performed by: INTERNAL MEDICINE

## 2024-11-25 PROCEDURE — G8428 CUR MEDS NOT DOCUMENT: HCPCS | Performed by: INTERNAL MEDICINE

## 2024-11-25 PROCEDURE — 3078F DIAST BP <80 MM HG: CPT | Performed by: INTERNAL MEDICINE

## 2024-11-25 PROCEDURE — G8484 FLU IMMUNIZE NO ADMIN: HCPCS | Performed by: INTERNAL MEDICINE

## 2024-11-25 PROCEDURE — G8419 CALC BMI OUT NRM PARAM NOF/U: HCPCS | Performed by: INTERNAL MEDICINE

## 2024-11-25 PROCEDURE — 1036F TOBACCO NON-USER: CPT | Performed by: INTERNAL MEDICINE

## 2024-11-25 PROCEDURE — 1123F ACP DISCUSS/DSCN MKR DOCD: CPT | Performed by: INTERNAL MEDICINE

## 2024-11-25 PROCEDURE — 3075F SYST BP GE 130 - 139MM HG: CPT | Performed by: INTERNAL MEDICINE

## 2024-11-25 PROCEDURE — 1126F AMNT PAIN NOTED NONE PRSNT: CPT | Performed by: INTERNAL MEDICINE

## 2024-11-25 RX ORDER — AMLODIPINE BESYLATE 5 MG/1
5 TABLET ORAL DAILY
Qty: 90 TABLET | Refills: 3 | Status: SHIPPED | OUTPATIENT
Start: 2024-11-25

## 2024-11-25 NOTE — PROGRESS NOTES
5 MG tablet; Take 1 tablet by mouth daily      Return in about 9 months (around 8/25/2025).       Aguila Paris MD  11/25/2024  10:43 AM      The patient (or guardian, if applicable) and other individuals in attendance with the patient were advised that Artificial Intelligence will be utilized during this visit to record, process the conversation to generate a clinical note, and support improvement of the AI technology. The patient (or guardian, if applicable) and other individuals in attendance at the appointment consented to the use of AI, including the recording.

## 2025-04-04 SDOH — ECONOMIC STABILITY: FOOD INSECURITY: WITHIN THE PAST 12 MONTHS, YOU WORRIED THAT YOUR FOOD WOULD RUN OUT BEFORE YOU GOT MONEY TO BUY MORE.: NEVER TRUE

## 2025-04-04 SDOH — HEALTH STABILITY: PHYSICAL HEALTH: ON AVERAGE, HOW MANY MINUTES DO YOU ENGAGE IN EXERCISE AT THIS LEVEL?: 70 MIN

## 2025-04-04 SDOH — ECONOMIC STABILITY: FOOD INSECURITY: WITHIN THE PAST 12 MONTHS, THE FOOD YOU BOUGHT JUST DIDN'T LAST AND YOU DIDN'T HAVE MONEY TO GET MORE.: NEVER TRUE

## 2025-04-04 SDOH — ECONOMIC STABILITY: INCOME INSECURITY: IN THE LAST 12 MONTHS, WAS THERE A TIME WHEN YOU WERE NOT ABLE TO PAY THE MORTGAGE OR RENT ON TIME?: NO

## 2025-04-04 SDOH — ECONOMIC STABILITY: TRANSPORTATION INSECURITY
IN THE PAST 12 MONTHS, HAS THE LACK OF TRANSPORTATION KEPT YOU FROM MEDICAL APPOINTMENTS OR FROM GETTING MEDICATIONS?: NO

## 2025-04-04 SDOH — HEALTH STABILITY: PHYSICAL HEALTH: ON AVERAGE, HOW MANY DAYS PER WEEK DO YOU ENGAGE IN MODERATE TO STRENUOUS EXERCISE (LIKE A BRISK WALK)?: 5 DAYS

## 2025-04-04 SDOH — ECONOMIC STABILITY: TRANSPORTATION INSECURITY
IN THE PAST 12 MONTHS, HAS LACK OF TRANSPORTATION KEPT YOU FROM MEETINGS, WORK, OR FROM GETTING THINGS NEEDED FOR DAILY LIVING?: NO

## 2025-04-04 ASSESSMENT — LIFESTYLE VARIABLES
HOW MANY STANDARD DRINKS CONTAINING ALCOHOL DO YOU HAVE ON A TYPICAL DAY: PATIENT DOES NOT DRINK
HOW MANY STANDARD DRINKS CONTAINING ALCOHOL DO YOU HAVE ON A TYPICAL DAY: 0
HOW OFTEN DO YOU HAVE SIX OR MORE DRINKS ON ONE OCCASION: 1
HOW OFTEN DO YOU HAVE A DRINK CONTAINING ALCOHOL: NEVER
HOW OFTEN DO YOU HAVE A DRINK CONTAINING ALCOHOL: 1

## 2025-04-04 ASSESSMENT — PATIENT HEALTH QUESTIONNAIRE - PHQ9
SUM OF ALL RESPONSES TO PHQ QUESTIONS 1-9: 0
1. LITTLE INTEREST OR PLEASURE IN DOING THINGS: NOT AT ALL
2. FEELING DOWN, DEPRESSED OR HOPELESS: NOT AT ALL
SUM OF ALL RESPONSES TO PHQ QUESTIONS 1-9: 0

## 2025-04-06 NOTE — PROGRESS NOTES
Medicare Annual Wellness Visit    Dariusz Laurent is here for Medicare AWV (Spot on back right ear wants you to look at)    Assessment & Plan     Medicare annual wellness visit, subsequent  -updated problem list, care team and history  -discussed recommended vaccines  -colonosocpy last done 2009     No follow-ups on file.     Subjective       Patient's complete Health Risk Assessment and screening values have been reviewed and are found in Flowsheets. The following problems were reviewed today and where indicated follow up appointments were made and/or referrals ordered.    No Positive Risk Factors identified today.                                    Objective   Vitals:    04/07/25 0940   BP: 138/80   Pulse: 57   Resp: 16   SpO2: 98%   Weight: 97.8 kg (215 lb 9.6 oz)   Height: 1.88 m (6' 2\")      Body mass index is 27.68 kg/m².                    Allergies   Allergen Reactions    Pseudoephedrine Hcl Other (See Comments)     Agitated & blisters on hands & feet.    Triprolidine-Pseudoephedrine Other (See Comments)     Make him hyper and the palms of his hands blister and peel    Valsartan Rash     Prior to Visit Medications    Medication Sig Taking? Authorizing Provider   amLODIPine (NORVASC) 5 MG tablet Take 1 tablet by mouth daily  Aguila Paris MD   omeprazole (PRILOSEC) 40 MG delayed release capsule Take 1 capsule by mouth daily  Aram Crook MD   Glucosamine-Chondroitin (GLUCOSAMINE CHONDROITIN COMPLX) 500-250 MG CAPS Take by mouth  ProviderSilvia MD   hydrALAZINE (APRESOLINE) 25 MG tablet Take 1 tablet by mouth 3 times daily as needed (Systolic pressure greater than 160 mmHg)  Patient taking differently: Take 1 tablet by mouth 3 times daily as needed (Systolic pressure greater than 160 mmHg) As needed  Aguila Paris MD   amoxicillin (AMOXIL) 500 MG tablet Take 4 tablets by mouth once Prior to dental  ProviderSilvia MD   LUTEIN PO Take by mouth  ProviderSilvia MD   Multiple

## 2025-04-07 ENCOUNTER — OFFICE VISIT (OUTPATIENT)
Dept: FAMILY MEDICINE CLINIC | Facility: CLINIC | Age: 80
End: 2025-04-07
Payer: MEDICARE

## 2025-04-07 VITALS
HEIGHT: 74 IN | SYSTOLIC BLOOD PRESSURE: 138 MMHG | DIASTOLIC BLOOD PRESSURE: 80 MMHG | BODY MASS INDEX: 27.67 KG/M2 | WEIGHT: 215.6 LBS | RESPIRATION RATE: 16 BRPM | HEART RATE: 57 BPM | OXYGEN SATURATION: 98 %

## 2025-04-07 DIAGNOSIS — Z95.2 S/P AVR (AORTIC VALVE REPLACEMENT): ICD-10-CM

## 2025-04-07 DIAGNOSIS — I35.0 SEVERE AORTIC VALVE STENOSIS: ICD-10-CM

## 2025-04-07 DIAGNOSIS — Z00.00 MEDICARE ANNUAL WELLNESS VISIT, SUBSEQUENT: Primary | ICD-10-CM

## 2025-04-07 DIAGNOSIS — K21.9 GASTROESOPHAGEAL REFLUX DISEASE, UNSPECIFIED WHETHER ESOPHAGITIS PRESENT: ICD-10-CM

## 2025-04-07 DIAGNOSIS — D69.6 THROMBOCYTOPENIA: ICD-10-CM

## 2025-04-07 DIAGNOSIS — L57.0 ACTINIC KERATOSIS: ICD-10-CM

## 2025-04-07 DIAGNOSIS — I10 HYPERTENSION, UNSPECIFIED TYPE: ICD-10-CM

## 2025-04-07 LAB
ALBUMIN SERPL-MCNC: 4.1 G/DL (ref 3.2–4.6)
ALBUMIN/GLOB SERPL: 1.6 (ref 1–1.9)
ALP SERPL-CCNC: 49 U/L (ref 40–129)
ALT SERPL-CCNC: 22 U/L (ref 8–55)
ANION GAP SERPL CALC-SCNC: 9 MMOL/L (ref 7–16)
AST SERPL-CCNC: 26 U/L (ref 15–37)
BASOPHILS # BLD: 0.04 K/UL (ref 0–0.2)
BASOPHILS NFR BLD: 0.5 % (ref 0–2)
BILIRUB SERPL-MCNC: 0.4 MG/DL (ref 0–1.2)
BUN SERPL-MCNC: 20 MG/DL (ref 8–23)
CALCIUM SERPL-MCNC: 9.5 MG/DL (ref 8.8–10.2)
CHLORIDE SERPL-SCNC: 107 MMOL/L (ref 98–107)
CO2 SERPL-SCNC: 25 MMOL/L (ref 20–29)
CREAT SERPL-MCNC: 1.05 MG/DL (ref 0.8–1.3)
DIFFERENTIAL METHOD BLD: NORMAL
EOSINOPHIL # BLD: 0.06 K/UL (ref 0–0.8)
EOSINOPHIL NFR BLD: 0.8 % (ref 0.5–7.8)
ERYTHROCYTE [DISTWIDTH] IN BLOOD BY AUTOMATED COUNT: 13.2 % (ref 11.9–14.6)
GLOBULIN SER CALC-MCNC: 2.6 G/DL (ref 2.3–3.5)
GLUCOSE SERPL-MCNC: 93 MG/DL (ref 70–99)
HCT VFR BLD AUTO: 42.9 % (ref 41.1–50.3)
HGB BLD-MCNC: 14.2 G/DL (ref 13.6–17.2)
IMM GRANULOCYTES # BLD AUTO: 0.02 K/UL (ref 0–0.5)
IMM GRANULOCYTES NFR BLD AUTO: 0.3 % (ref 0–5)
LYMPHOCYTES # BLD: 1.46 K/UL (ref 0.5–4.6)
LYMPHOCYTES NFR BLD: 20 % (ref 13–44)
MCH RBC QN AUTO: 30.5 PG (ref 26.1–32.9)
MCHC RBC AUTO-ENTMCNC: 33.1 G/DL (ref 31.4–35)
MCV RBC AUTO: 92.3 FL (ref 82–102)
MONOCYTES # BLD: 0.73 K/UL (ref 0.1–1.3)
MONOCYTES NFR BLD: 10 % (ref 4–12)
NEUTS SEG # BLD: 4.99 K/UL (ref 1.7–8.2)
NEUTS SEG NFR BLD: 68.4 % (ref 43–78)
NRBC # BLD: 0 K/UL (ref 0–0.2)
PLATELET # BLD AUTO: 154 K/UL (ref 150–450)
PMV BLD AUTO: 10.3 FL (ref 9.4–12.3)
POTASSIUM SERPL-SCNC: 4.5 MMOL/L (ref 3.5–5.1)
PROT SERPL-MCNC: 6.7 G/DL (ref 6.3–8.2)
RBC # BLD AUTO: 4.65 M/UL (ref 4.23–5.6)
SODIUM SERPL-SCNC: 141 MMOL/L (ref 136–145)
WBC # BLD AUTO: 7.3 K/UL (ref 4.3–11.1)

## 2025-04-07 PROCEDURE — G8427 DOCREV CUR MEDS BY ELIG CLIN: HCPCS | Performed by: FAMILY MEDICINE

## 2025-04-07 PROCEDURE — 1036F TOBACCO NON-USER: CPT | Performed by: FAMILY MEDICINE

## 2025-04-07 PROCEDURE — 1160F RVW MEDS BY RX/DR IN RCRD: CPT | Performed by: FAMILY MEDICINE

## 2025-04-07 PROCEDURE — 3079F DIAST BP 80-89 MM HG: CPT | Performed by: FAMILY MEDICINE

## 2025-04-07 PROCEDURE — G8419 CALC BMI OUT NRM PARAM NOF/U: HCPCS | Performed by: FAMILY MEDICINE

## 2025-04-07 PROCEDURE — 99213 OFFICE O/P EST LOW 20 MIN: CPT | Performed by: FAMILY MEDICINE

## 2025-04-07 PROCEDURE — G0439 PPPS, SUBSEQ VISIT: HCPCS | Performed by: FAMILY MEDICINE

## 2025-04-07 PROCEDURE — 3075F SYST BP GE 130 - 139MM HG: CPT | Performed by: FAMILY MEDICINE

## 2025-04-07 PROCEDURE — G2211 COMPLEX E/M VISIT ADD ON: HCPCS | Performed by: FAMILY MEDICINE

## 2025-04-07 PROCEDURE — 1159F MED LIST DOCD IN RCRD: CPT | Performed by: FAMILY MEDICINE

## 2025-04-07 PROCEDURE — 1123F ACP DISCUSS/DSCN MKR DOCD: CPT | Performed by: FAMILY MEDICINE

## 2025-04-07 RX ORDER — OMEPRAZOLE 20 MG/1
20 CAPSULE, DELAYED RELEASE ORAL DAILY
Qty: 90 CAPSULE | Refills: 3 | Status: SHIPPED | OUTPATIENT
Start: 2025-04-07

## 2025-04-07 ASSESSMENT — ENCOUNTER SYMPTOMS
CONSTIPATION: 0
ROS SKIN COMMENTS: SKIN LESIONS
WHEEZING: 0
CHEST TIGHTNESS: 0
DIARRHEA: 0
SHORTNESS OF BREATH: 0
ABDOMINAL PAIN: 0
BACK PAIN: 0

## 2025-04-07 NOTE — ASSESSMENT & PLAN NOTE
Well-controlled, continue current medications.  Advised to avoid triggers.  Can continue to slowly wean down medication

## 2025-04-08 ENCOUNTER — RESULTS FOLLOW-UP (OUTPATIENT)
Dept: FAMILY MEDICINE CLINIC | Facility: CLINIC | Age: 80
End: 2025-04-08

## 2025-08-11 ENCOUNTER — OFFICE VISIT (OUTPATIENT)
Age: 80
End: 2025-08-11
Payer: MEDICARE

## 2025-08-11 VITALS
WEIGHT: 208 LBS | DIASTOLIC BLOOD PRESSURE: 86 MMHG | BODY MASS INDEX: 26.69 KG/M2 | HEART RATE: 64 BPM | SYSTOLIC BLOOD PRESSURE: 128 MMHG | HEIGHT: 74 IN

## 2025-08-11 DIAGNOSIS — I77.819 DILATATION OF AORTA: ICD-10-CM

## 2025-08-11 DIAGNOSIS — I10 PRIMARY HYPERTENSION: ICD-10-CM

## 2025-08-11 DIAGNOSIS — Z95.2 S/P AVR (AORTIC VALVE REPLACEMENT): Primary | ICD-10-CM

## 2025-08-11 DIAGNOSIS — I49.3 PVC (PREMATURE VENTRICULAR CONTRACTION): ICD-10-CM

## 2025-08-11 PROCEDURE — 3079F DIAST BP 80-89 MM HG: CPT | Performed by: INTERNAL MEDICINE

## 2025-08-11 PROCEDURE — G8419 CALC BMI OUT NRM PARAM NOF/U: HCPCS | Performed by: INTERNAL MEDICINE

## 2025-08-11 PROCEDURE — 1159F MED LIST DOCD IN RCRD: CPT | Performed by: INTERNAL MEDICINE

## 2025-08-11 PROCEDURE — 1123F ACP DISCUSS/DSCN MKR DOCD: CPT | Performed by: INTERNAL MEDICINE

## 2025-08-11 PROCEDURE — 1036F TOBACCO NON-USER: CPT | Performed by: INTERNAL MEDICINE

## 2025-08-11 PROCEDURE — G8427 DOCREV CUR MEDS BY ELIG CLIN: HCPCS | Performed by: INTERNAL MEDICINE

## 2025-08-11 PROCEDURE — 99214 OFFICE O/P EST MOD 30 MIN: CPT | Performed by: INTERNAL MEDICINE

## 2025-08-11 PROCEDURE — 3074F SYST BP LT 130 MM HG: CPT | Performed by: INTERNAL MEDICINE

## 2025-08-11 RX ORDER — SULFACETAMIDE SODIUM, SULFUR 100; 50 MG/G; MG/G
EMULSION TOPICAL
COMMUNITY
Start: 2025-07-16

## 2025-08-11 RX ORDER — DIAZEPAM 2 MG/1
2 TABLET ORAL
Qty: 1 TABLET | Refills: 0 | Status: SHIPPED | OUTPATIENT
Start: 2025-08-11 | End: 2025-08-12

## 2025-08-19 ENCOUNTER — HOSPITAL ENCOUNTER (OUTPATIENT)
Dept: MRI IMAGING | Age: 80
Discharge: HOME | End: 2025-08-21
Attending: INTERNAL MEDICINE
Payer: MEDICARE

## 2025-08-19 DIAGNOSIS — I77.819 DILATATION OF AORTA: ICD-10-CM

## 2025-08-19 PROCEDURE — C8911 MRA W/O FOL W/CONT, CHEST: HCPCS

## 2025-08-19 PROCEDURE — 71555 MRI ANGIO CHEST W OR W/O DYE: CPT | Performed by: RADIOLOGY

## 2025-08-19 PROCEDURE — A9579 GAD-BASE MR CONTRAST NOS,1ML: HCPCS | Performed by: INTERNAL MEDICINE

## 2025-08-19 PROCEDURE — 6360000004 HC RX CONTRAST MEDICATION: Performed by: INTERNAL MEDICINE

## 2025-08-19 RX ORDER — GADOTERIDOL 279.3 MG/ML
20 INJECTION INTRAVENOUS
Status: COMPLETED | OUTPATIENT
Start: 2025-08-19 | End: 2025-08-19

## 2025-08-19 RX ADMIN — GADOTERIDOL 20 ML: 279.3 INJECTION, SOLUTION INTRAVENOUS at 11:35
